# Patient Record
Sex: FEMALE | Race: WHITE | NOT HISPANIC OR LATINO
[De-identification: names, ages, dates, MRNs, and addresses within clinical notes are randomized per-mention and may not be internally consistent; named-entity substitution may affect disease eponyms.]

---

## 2017-01-03 ENCOUNTER — TRANSCRIPTION ENCOUNTER (OUTPATIENT)
Age: 27
End: 2017-01-03

## 2017-01-05 ENCOUNTER — TRANSCRIPTION ENCOUNTER (OUTPATIENT)
Age: 27
End: 2017-01-05

## 2017-01-17 ENCOUNTER — TRANSCRIPTION ENCOUNTER (OUTPATIENT)
Age: 27
End: 2017-01-17

## 2017-01-18 ENCOUNTER — APPOINTMENT (OUTPATIENT)
Dept: RHEUMATOLOGY | Facility: CLINIC | Age: 27
End: 2017-01-18

## 2017-01-31 ENCOUNTER — OTHER (OUTPATIENT)
Age: 27
End: 2017-01-31

## 2017-03-19 ENCOUNTER — INPATIENT (INPATIENT)
Facility: HOSPITAL | Age: 27
LOS: 1 days | Discharge: HOME | End: 2017-03-21
Attending: INTERNAL MEDICINE | Admitting: INTERNAL MEDICINE
Payer: COMMERCIAL

## 2017-03-19 VITALS — WEIGHT: 134.92 LBS

## 2017-03-19 DIAGNOSIS — K52.9 NONINFECTIVE GASTROENTERITIS AND COLITIS, UNSPECIFIED: ICD-10-CM

## 2017-03-19 DIAGNOSIS — E03.9 HYPOTHYROIDISM, UNSPECIFIED: ICD-10-CM

## 2017-03-19 DIAGNOSIS — E87.6 HYPOKALEMIA: ICD-10-CM

## 2017-03-19 DIAGNOSIS — E86.0 DEHYDRATION: ICD-10-CM

## 2017-03-19 DIAGNOSIS — R74.8 ABNORMAL LEVELS OF OTHER SERUM ENZYMES: ICD-10-CM

## 2017-03-19 DIAGNOSIS — Z86.011 PERSONAL HISTORY OF BENIGN NEOPLASM OF THE BRAIN: Chronic | ICD-10-CM

## 2017-03-19 DIAGNOSIS — E83.42 HYPOMAGNESEMIA: ICD-10-CM

## 2017-03-19 DIAGNOSIS — K21.9 GASTRO-ESOPHAGEAL REFLUX DISEASE WITHOUT ESOPHAGITIS: ICD-10-CM

## 2017-03-19 DIAGNOSIS — D72.829 ELEVATED WHITE BLOOD CELL COUNT, UNSPECIFIED: ICD-10-CM

## 2017-03-19 DIAGNOSIS — R31.29 OTHER MICROSCOPIC HEMATURIA: ICD-10-CM

## 2017-03-19 DIAGNOSIS — E87.2 ACIDOSIS: ICD-10-CM

## 2017-03-19 DIAGNOSIS — J45.909 UNSPECIFIED ASTHMA, UNCOMPLICATED: ICD-10-CM

## 2017-03-19 LAB
ALBUMIN SERPL ELPH-MCNC: 3.3 G/DL — SIGNIFICANT CHANGE UP (ref 3.3–5)
ALP SERPL-CCNC: 60 U/L — SIGNIFICANT CHANGE UP (ref 40–120)
ALT FLD-CCNC: 15 U/L — SIGNIFICANT CHANGE UP (ref 12–78)
ANION GAP SERPL CALC-SCNC: 12 MMOL/L — SIGNIFICANT CHANGE UP (ref 5–17)
APPEARANCE UR: CLEAR — SIGNIFICANT CHANGE UP
AST SERPL-CCNC: 19 U/L — SIGNIFICANT CHANGE UP (ref 15–37)
BASOPHILS # BLD AUTO: 0.1 K/UL — SIGNIFICANT CHANGE UP (ref 0–0.2)
BASOPHILS NFR BLD AUTO: 0.5 % — SIGNIFICANT CHANGE UP (ref 0–2)
BILIRUB SERPL-MCNC: 0.5 MG/DL — SIGNIFICANT CHANGE UP (ref 0.2–1.2)
BILIRUB UR-MCNC: NEGATIVE — SIGNIFICANT CHANGE UP
BUN SERPL-MCNC: 8 MG/DL — SIGNIFICANT CHANGE UP (ref 7–23)
CALCIUM SERPL-MCNC: 8.2 MG/DL — LOW (ref 8.5–10.1)
CHLORIDE SERPL-SCNC: 112 MMOL/L — HIGH (ref 96–108)
CO2 SERPL-SCNC: 20 MMOL/L — LOW (ref 22–31)
COLOR SPEC: YELLOW — SIGNIFICANT CHANGE UP
CREAT SERPL-MCNC: 0.91 MG/DL — SIGNIFICANT CHANGE UP (ref 0.5–1.3)
DIFF PNL FLD: (no result)
EOSINOPHIL # BLD AUTO: 0.1 K/UL — SIGNIFICANT CHANGE UP (ref 0–0.5)
EOSINOPHIL NFR BLD AUTO: 0.8 % — SIGNIFICANT CHANGE UP (ref 0–6)
GLUCOSE SERPL-MCNC: 83 MG/DL — SIGNIFICANT CHANGE UP (ref 70–99)
GLUCOSE UR QL: NEGATIVE MG/DL — SIGNIFICANT CHANGE UP
HCT VFR BLD CALC: 44.6 % — SIGNIFICANT CHANGE UP (ref 34.5–45)
HGB BLD-MCNC: 15.5 G/DL — SIGNIFICANT CHANGE UP (ref 11.5–15.5)
KETONES UR-MCNC: (no result)
LEUKOCYTE ESTERASE UR-ACNC: NEGATIVE — SIGNIFICANT CHANGE UP
LIDOCAIN IGE QN: 3502 U/L — HIGH (ref 73–393)
LYMPHOCYTES # BLD AUTO: 1.5 K/UL — SIGNIFICANT CHANGE UP (ref 1–3.3)
LYMPHOCYTES # BLD AUTO: 8.3 % — LOW (ref 13–44)
MAGNESIUM SERPL-MCNC: 1.4 MG/DL — LOW (ref 1.8–2.4)
MCHC RBC-ENTMCNC: 31.6 PG — SIGNIFICANT CHANGE UP (ref 27–34)
MCHC RBC-ENTMCNC: 34.8 GM/DL — SIGNIFICANT CHANGE UP (ref 32–36)
MCV RBC AUTO: 90.7 FL — SIGNIFICANT CHANGE UP (ref 80–100)
MONOCYTES # BLD AUTO: 0.7 K/UL — SIGNIFICANT CHANGE UP (ref 0–0.9)
MONOCYTES NFR BLD AUTO: 3.9 % — SIGNIFICANT CHANGE UP (ref 2–14)
NEUTROPHILS # BLD AUTO: 15.7 K/UL — HIGH (ref 1.8–7.4)
NEUTROPHILS NFR BLD AUTO: 86.5 % — HIGH (ref 43–77)
NITRITE UR-MCNC: NEGATIVE — SIGNIFICANT CHANGE UP
PH UR: 7 — SIGNIFICANT CHANGE UP (ref 4.8–8)
PLATELET # BLD AUTO: 334 K/UL — SIGNIFICANT CHANGE UP (ref 150–400)
POTASSIUM SERPL-MCNC: 3.3 MMOL/L — LOW (ref 3.5–5.3)
POTASSIUM SERPL-SCNC: 3.3 MMOL/L — LOW (ref 3.5–5.3)
PROT SERPL-MCNC: 6.4 GM/DL — SIGNIFICANT CHANGE UP (ref 6–8.3)
PROT UR-MCNC: NEGATIVE MG/DL — SIGNIFICANT CHANGE UP
RBC # BLD: 4.92 M/UL — SIGNIFICANT CHANGE UP (ref 3.8–5.2)
RBC # FLD: 11.1 % — SIGNIFICANT CHANGE UP (ref 10.3–14.5)
RBC CASTS # UR COMP ASSIST: (no result) /HPF (ref 0–4)
SODIUM SERPL-SCNC: 144 MMOL/L — SIGNIFICANT CHANGE UP (ref 135–145)
SP GR SPEC: 1 — LOW (ref 1.01–1.02)
UROBILINOGEN FLD QL: NEGATIVE MG/DL — SIGNIFICANT CHANGE UP
WBC # BLD: 18.1 K/UL — HIGH (ref 3.8–10.5)
WBC # FLD AUTO: 18.1 K/UL — HIGH (ref 3.8–10.5)
WBC UR QL: SIGNIFICANT CHANGE UP

## 2017-03-19 PROCEDURE — 76705 ECHO EXAM OF ABDOMEN: CPT | Mod: 26

## 2017-03-19 PROCEDURE — 99285 EMERGENCY DEPT VISIT HI MDM: CPT | Mod: 25

## 2017-03-19 RX ORDER — FLUTICASONE PROPIONATE AND SALMETEROL 50; 250 UG/1; UG/1
1 POWDER ORAL; RESPIRATORY (INHALATION)
Qty: 0 | Refills: 0 | Status: DISCONTINUED | OUTPATIENT
Start: 2017-03-19 | End: 2017-03-21

## 2017-03-19 RX ORDER — ONDANSETRON 8 MG/1
4 TABLET, FILM COATED ORAL EVERY 6 HOURS
Qty: 0 | Refills: 0 | Status: DISCONTINUED | OUTPATIENT
Start: 2017-03-19 | End: 2017-03-21

## 2017-03-19 RX ORDER — PANTOPRAZOLE SODIUM 20 MG/1
40 TABLET, DELAYED RELEASE ORAL
Qty: 0 | Refills: 0 | Status: DISCONTINUED | OUTPATIENT
Start: 2017-03-19 | End: 2017-03-21

## 2017-03-19 RX ORDER — FLUTICASONE PROPIONATE AND SALMETEROL 50; 250 UG/1; UG/1
1 POWDER ORAL; RESPIRATORY (INHALATION)
Qty: 0 | Refills: 0 | Status: DISCONTINUED | OUTPATIENT
Start: 2017-03-19 | End: 2017-03-19

## 2017-03-19 RX ORDER — FLUTICASONE PROPIONATE 50 MCG
1 SPRAY, SUSPENSION NASAL
Qty: 0 | Refills: 0 | Status: DISCONTINUED | OUTPATIENT
Start: 2017-03-19 | End: 2017-03-21

## 2017-03-19 RX ORDER — MAGNESIUM SULFATE 500 MG/ML
2 VIAL (ML) INJECTION ONCE
Qty: 0 | Refills: 0 | Status: COMPLETED | OUTPATIENT
Start: 2017-03-19 | End: 2017-03-19

## 2017-03-19 RX ORDER — IPRATROPIUM BROMIDE 0.2 MG/ML
500 SOLUTION, NON-ORAL INHALATION ONCE
Qty: 0 | Refills: 0 | Status: COMPLETED | OUTPATIENT
Start: 2017-03-19 | End: 2017-03-19

## 2017-03-19 RX ORDER — FAMOTIDINE 10 MG/ML
20 INJECTION INTRAVENOUS ONCE
Qty: 0 | Refills: 0 | Status: COMPLETED | OUTPATIENT
Start: 2017-03-19 | End: 2017-03-19

## 2017-03-19 RX ORDER — ALBUTEROL 90 UG/1
2.5 AEROSOL, METERED ORAL ONCE
Qty: 0 | Refills: 0 | Status: COMPLETED | OUTPATIENT
Start: 2017-03-19 | End: 2017-03-19

## 2017-03-19 RX ORDER — SODIUM CHLORIDE 9 MG/ML
2000 INJECTION INTRAMUSCULAR; INTRAVENOUS; SUBCUTANEOUS ONCE
Qty: 0 | Refills: 0 | Status: COMPLETED | OUTPATIENT
Start: 2017-03-19 | End: 2017-03-19

## 2017-03-19 RX ORDER — IPRATROPIUM/ALBUTEROL SULFATE 18-103MCG
1 AEROSOL WITH ADAPTER (GRAM) INHALATION
Qty: 0 | Refills: 0 | Status: DISCONTINUED | OUTPATIENT
Start: 2017-03-19 | End: 2017-03-19

## 2017-03-19 RX ORDER — POTASSIUM CHLORIDE 20 MEQ
10 PACKET (EA) ORAL ONCE
Qty: 0 | Refills: 0 | Status: COMPLETED | OUTPATIENT
Start: 2017-03-19 | End: 2017-03-19

## 2017-03-19 RX ORDER — MONTELUKAST 4 MG/1
10 TABLET, CHEWABLE ORAL DAILY
Qty: 0 | Refills: 0 | Status: DISCONTINUED | OUTPATIENT
Start: 2017-03-19 | End: 2017-03-20

## 2017-03-19 RX ORDER — LEVOTHYROXINE SODIUM 125 MCG
75 TABLET ORAL DAILY
Qty: 0 | Refills: 0 | Status: DISCONTINUED | OUTPATIENT
Start: 2017-03-19 | End: 2017-03-21

## 2017-03-19 RX ORDER — POTASSIUM CHLORIDE 20 MEQ
10 PACKET (EA) ORAL
Qty: 0 | Refills: 0 | Status: COMPLETED | OUTPATIENT
Start: 2017-03-19 | End: 2017-03-19

## 2017-03-19 RX ORDER — ONDANSETRON 8 MG/1
8 TABLET, FILM COATED ORAL ONCE
Qty: 0 | Refills: 0 | Status: COMPLETED | OUTPATIENT
Start: 2017-03-19 | End: 2017-03-19

## 2017-03-19 RX ADMIN — SODIUM CHLORIDE 2000 MILLILITER(S): 9 INJECTION INTRAMUSCULAR; INTRAVENOUS; SUBCUTANEOUS at 03:48

## 2017-03-19 RX ADMIN — ALBUTEROL 2.5 MILLIGRAM(S): 90 AEROSOL, METERED ORAL at 06:30

## 2017-03-19 RX ADMIN — Medication 100 MILLIEQUIVALENT(S): at 14:27

## 2017-03-19 RX ADMIN — Medication 500 MICROGRAM(S): at 06:30

## 2017-03-19 RX ADMIN — FAMOTIDINE 20 MILLIGRAM(S): 10 INJECTION INTRAVENOUS at 04:11

## 2017-03-19 RX ADMIN — Medication 100 MILLIEQUIVALENT(S): at 16:41

## 2017-03-19 RX ADMIN — Medication 100 MILLIEQUIVALENT(S): at 17:54

## 2017-03-19 RX ADMIN — Medication 50 GRAM(S): at 14:27

## 2017-03-19 RX ADMIN — ONDANSETRON 8 MILLIGRAM(S): 8 TABLET, FILM COATED ORAL at 04:10

## 2017-03-19 RX ADMIN — Medication 1 SPRAY(S): at 21:21

## 2017-03-19 RX ADMIN — Medication 100 MILLIEQUIVALENT(S): at 06:11

## 2017-03-19 RX ADMIN — ONDANSETRON 4 MILLIGRAM(S): 8 TABLET, FILM COATED ORAL at 18:16

## 2017-03-19 RX ADMIN — MONTELUKAST 10 MILLIGRAM(S): 4 TABLET, CHEWABLE ORAL at 21:21

## 2017-03-19 NOTE — ED PROVIDER NOTE - DETAILS:
Mercedes Baeza MD - The scribe's documentation has been prepared under my direction and personally reviewed by me in its entirety. I confirm that the note above accurately reflects all work, treatment, procedures, and medical decision making performed by me.

## 2017-03-19 NOTE — H&P ADULT - NSHPLABSRESULTS_GEN_ALL_CORE
15.5   18.1  )-----------( 334      ( 19 Mar 2017 05:06 )             44.6     19 Mar 2017 05:06    144    |  112    |  8      ----------------------------<  83     3.3     |  20     |  0.91     Ca    8.2        19 Mar 2017 05:06  Mg     1.4       19 Mar 2017 07:19    TPro  6.4    /  Alb  3.3    /  TBili  0.5    /  DBili  x      /  AST  19     /  ALT  15     /  AlkPhos  60     19 Mar 2017 05:06        LIVER FUNCTIONS - ( 19 Mar 2017 05:06 )  Alb: 3.3 g/dL / Pro: 6.4 gm/dL / ALK PHOS: 60 U/L / ALT: 15 U/L / AST: 19 U/L / GGT: x             Urinalysis Basic - ( 19 Mar 2017 07:42 )    Color: Yellow / Appearance: Clear / S.005 / pH: x  Gluc: x / Ketone: Small  / Bili: Negative / Urobili: Negative mg/dL   Blood: x / Protein: Negative mg/dL / Nitrite: Negative   Leuk Esterase: Negative / RBC: 3-5 /HPF / WBC 0-2   Sq Epi: x / Non Sq Epi: x / Bacteria: x        EXAM:  US ABDOMEN LIMITED                        PROCEDURE DATE:  2017      FINDINGS:   The liver demonstrates homogeneous echotexture without focal lesion.    Hepatic size and contours are maintained.  Hepatic and portal veins   appear patent and are not displaced.  No intrahepatic or ductal   dilatation is found.  The common duct is not dilated, measuring 0.26 cm.    The gallbladder is intact without calculi.  There is no gallbaldder wall  thickening.  No tenderness was elicited with direct compression by the   transducer (no sonographic Fountain's sign identified). The pancreas   appears unremarkable.    The right kidney measures 10.3 cm in length.  It demonstrates no mass,   calculusor hydronephrosis.  The abdominal aorta and IVC appear intact.    IMPRESSION:   Unremarkable right upper quadrant ultrasound.

## 2017-03-19 NOTE — ED PROVIDER NOTE - OBJECTIVE STATEMENT
25 y/o female pmhx asthma, acid reflux, benign brain tumor resected in 2010 presents to ED due to v/d. Pt had a sinus infection and was prescribed Clindamycin be ENT. Pt took 7 days of 150 mg QID of clinda. She began with nausea on Wednesday, the 7th day of clinda. On Thursday and Friday pt had watery diarrhea. On Saturday pt had vomiting and diarrhea. Pt called ENT and told him she was having v/d. ENT advised her ot taking clinda, and called in a prescription for flagyl that she never went to . No real abd pain. C/o weakness, tired, no eating or drinking. Last menstrual period began today.

## 2017-03-19 NOTE — H&P ADULT - NSHPREVIEWOFSYSTEMS_GEN_ALL_CORE
CONSTITUTIONAL: No fevers or chills  EYES/ENT: No visual changes;  No vertigo or throat pain   NECK: No pain or stiffness  RESPIRATORY: No cough, wheezing, hemoptysis; No shortness of breath  CARDIOVASCULAR: No chest pain or palpitations  GASTROINTESTINAL:negative except per hpi  GENITOURINARY: No dysuria, frequency or hematuria  NEUROLOGICAL: No numbness or weakness  SKIN: No itching, burning, rashes, or lesions   12 point review of systems is negative unless indicated above.

## 2017-03-19 NOTE — H&P ADULT - PROBLEM SELECTOR PLAN 2
Non anion gap metabolic acidosis 2/2 diarrhea  S/p 2 L NS in ED  expect improvement if diarrhea resolved  recheck BMP in AM

## 2017-03-19 NOTE — H&P ADULT - FAMILY HISTORY
Mother  Still living? Unknown  Family history of hypothyroidism, Age at diagnosis: Age Unknown     Father  Still living? Unknown  Family history of bladder cancer, Age at diagnosis: Age Unknown

## 2017-03-19 NOTE — ED PROVIDER NOTE - CONSTITUTIONAL, MLM
normal... Well appearing, well nourished, awake, alert, oriented to person, place, time/situation and in no apparent distress. Well appearing, well nourished, awake, alert, oriented to person, place, time/situation and in no apparent distress.  Appears unwell but nontoxic

## 2017-03-19 NOTE — ED ADULT NURSE NOTE - OBJECTIVE STATEMENT
26 year old female presenting to the ED with parents complaining of nausea, vomiting, and diarrhea. Patient reports nausea and diarrhea x 3 days, with worsening vomiting and epigastric discomfort beginning tonight.

## 2017-03-19 NOTE — H&P ADULT - HISTORY OF PRESENT ILLNESS
25 yo F hx of asthma, gerd, hypothyroidism, khloe danlos syndrome presented eith severe nausea and vomiting since wednesday.  Pt reports that she compelted a 7 day course of clindamycin on Wednesday for treatment of sinus infection.  Since then she has been having intractable nausea/vomiting NBNB, unable to tolerate po , having diarrhea every hour, liquid no blood or mucus.   She was feeling very weak.  No recent travel or recent illness or unusual food exposure.  C/o some abd soreness from vomiting and diarrhea however no significant abd pain.  No chest pain, sob, no palpitations, no lightheadedness.    In the ED pt was tachycardic as high as 135 vs otherwise wnl,  labs - WBC 18K, Mag 1.4, K 3.3, HCO3 20, lipase 3500.  GB US was normal.  She got 2L NS bolus, pepcid IV and zofran.

## 2017-03-19 NOTE — H&P ADULT - NSHPPHYSICALEXAM_GEN_ALL_CORE
Constitutional: NAD, well-groomed, well-developed  HEENT: PERRLA, EOMI, Normal Hearing, MMM  Neck: No JVD  Back: Normal spine flexure, No CVA tenderness  Respiratory: CTABL  Cardiovascular: S1 and S2, RRR, no M/G/R  Gastrointestinal: BS+, soft, NT/ND  Extremities: No peripheral edema  Vascular: 2+ peripheral pulses  Neurological: A/O x 3, no focal deficits  Psychiatric: Normal mood, normal affect  Musculoskeletal: 5/5 strength b/l upper and lower extremities  Skin: No rashes

## 2017-03-19 NOTE — ED ADULT NURSE NOTE - CHPI ED SYMPTOMS NEG
no hematuria/no chills/no blood in stool/no fever/no abdominal distension/no burning urination/no dysuria

## 2017-03-19 NOTE — H&P ADULT - PROBLEM SELECTOR PLAN 1
Likely related to recent antibiotic use  admit to med surg  advnace diet as tolerated  c/w zofran and ppi  if diarrhea persists will check for cdiff- however no BM in 8 hours.

## 2017-03-19 NOTE — ED PROVIDER NOTE - MEDICAL DECISION MAKING DETAILS
27 yo female with n/v/d with pancreatitis, will US r/o gallstones as no other clear source.  will admit for further evaluation

## 2017-03-19 NOTE — ED ADULT NURSE REASSESSMENT NOTE - NS ED NURSE REASSESS COMMENT FT1
pt received at 0700 alert and orietnedx4, VSS afebrile, pt denies pain, pt resting comfortably in bed, no complaints at this time. Mother at bedside. All needs anticipated and met, safety maintained will continue to monitor

## 2017-03-20 LAB
ANION GAP SERPL CALC-SCNC: 8 MMOL/L — SIGNIFICANT CHANGE UP (ref 5–17)
BUN SERPL-MCNC: 5 MG/DL — LOW (ref 7–23)
CALCIUM SERPL-MCNC: 7.9 MG/DL — LOW (ref 8.5–10.1)
CHLORIDE SERPL-SCNC: 110 MMOL/L — HIGH (ref 96–108)
CO2 SERPL-SCNC: 26 MMOL/L — SIGNIFICANT CHANGE UP (ref 22–31)
CREAT SERPL-MCNC: 0.75 MG/DL — SIGNIFICANT CHANGE UP (ref 0.5–1.3)
GLUCOSE SERPL-MCNC: 87 MG/DL — SIGNIFICANT CHANGE UP (ref 70–99)
HCT VFR BLD CALC: 35.4 % — SIGNIFICANT CHANGE UP (ref 34.5–45)
HGB BLD-MCNC: 11.8 G/DL — SIGNIFICANT CHANGE UP (ref 11.5–15.5)
LIDOCAIN IGE QN: 149 U/L — SIGNIFICANT CHANGE UP (ref 73–393)
MCHC RBC-ENTMCNC: 30 PG — SIGNIFICANT CHANGE UP (ref 27–34)
MCHC RBC-ENTMCNC: 33.2 GM/DL — SIGNIFICANT CHANGE UP (ref 32–36)
MCV RBC AUTO: 90.5 FL — SIGNIFICANT CHANGE UP (ref 80–100)
PLATELET # BLD AUTO: 334 K/UL — SIGNIFICANT CHANGE UP (ref 150–400)
POTASSIUM SERPL-MCNC: 3.4 MMOL/L — LOW (ref 3.5–5.3)
POTASSIUM SERPL-SCNC: 3.4 MMOL/L — LOW (ref 3.5–5.3)
RBC # BLD: 3.92 M/UL — SIGNIFICANT CHANGE UP (ref 3.8–5.2)
RBC # FLD: 11.2 % — SIGNIFICANT CHANGE UP (ref 10.3–14.5)
SODIUM SERPL-SCNC: 144 MMOL/L — SIGNIFICANT CHANGE UP (ref 135–145)
WBC # BLD: 6.7 K/UL — SIGNIFICANT CHANGE UP (ref 3.8–10.5)
WBC # FLD AUTO: 6.7 K/UL — SIGNIFICANT CHANGE UP (ref 3.8–10.5)

## 2017-03-20 RX ORDER — POTASSIUM CHLORIDE 20 MEQ
10 PACKET (EA) ORAL
Qty: 0 | Refills: 0 | Status: COMPLETED | OUTPATIENT
Start: 2017-03-20 | End: 2017-03-20

## 2017-03-20 RX ORDER — ONDANSETRON 8 MG/1
4 TABLET, FILM COATED ORAL ONCE
Qty: 0 | Refills: 0 | Status: COMPLETED | OUTPATIENT
Start: 2017-03-20 | End: 2017-03-20

## 2017-03-20 RX ORDER — ONDANSETRON 8 MG/1
4 TABLET, FILM COATED ORAL EVERY 6 HOURS
Qty: 0 | Refills: 0 | Status: DISCONTINUED | OUTPATIENT
Start: 2017-03-20 | End: 2017-03-21

## 2017-03-20 RX ORDER — MONTELUKAST 4 MG/1
10 TABLET, CHEWABLE ORAL AT BEDTIME
Qty: 0 | Refills: 0 | Status: DISCONTINUED | OUTPATIENT
Start: 2017-03-20 | End: 2017-03-21

## 2017-03-20 RX ADMIN — FLUTICASONE PROPIONATE AND SALMETEROL 1 DOSE(S): 50; 250 POWDER ORAL; RESPIRATORY (INHALATION) at 08:49

## 2017-03-20 RX ADMIN — PANTOPRAZOLE SODIUM 40 MILLIGRAM(S): 20 TABLET, DELAYED RELEASE ORAL at 10:43

## 2017-03-20 RX ADMIN — FLUTICASONE PROPIONATE AND SALMETEROL 1 DOSE(S): 50; 250 POWDER ORAL; RESPIRATORY (INHALATION) at 20:11

## 2017-03-20 RX ADMIN — Medication 100 MILLIEQUIVALENT(S): at 19:20

## 2017-03-20 RX ADMIN — Medication 1 SPRAY(S): at 10:52

## 2017-03-20 RX ADMIN — Medication 75 MICROGRAM(S): at 05:49

## 2017-03-20 RX ADMIN — Medication 100 MILLIEQUIVALENT(S): at 21:30

## 2017-03-20 RX ADMIN — MONTELUKAST 10 MILLIGRAM(S): 4 TABLET, CHEWABLE ORAL at 21:31

## 2017-03-20 RX ADMIN — Medication 1 SPRAY(S): at 17:09

## 2017-03-20 RX ADMIN — Medication 100 MILLIEQUIVALENT(S): at 18:04

## 2017-03-20 RX ADMIN — ONDANSETRON 4 MILLIGRAM(S): 8 TABLET, FILM COATED ORAL at 15:11

## 2017-03-20 RX ADMIN — ONDANSETRON 4 MILLIGRAM(S): 8 TABLET, FILM COATED ORAL at 17:09

## 2017-03-20 NOTE — PROGRESS NOTE ADULT - PROBLEM SELECTOR PLAN 2
Non anion gap metabolic acidosis 2/2 diarrhea- resolved  S/p 2 L NS in ED  expect improvement if diarrhea resolved

## 2017-03-20 NOTE — PROGRESS NOTE ADULT - SUBJECTIVE AND OBJECTIVE BOX
CHIEF COMPLAINT: Nausea/ vomiting, diarrhea    SUBJECTIVE: Tolerating only minimal clear liquids with nausea.  Not vomiting.  Diarrhea decreased in frequency.      REVIEW OF SYSTEMS:  All other review of systems is negative unless indicated above    Vital Signs Last 24 Hrs  T(C): 36.4, Max: 37.3 (03-19 @ 11:08)  T(F): 97.6, Max: 99.1 (03-19 @ 11:08)  HR: 94 (94 - 95)  BP: 105/52 (91/69 - 108/66)  BP(mean): --  RR: 19 (18 - 19)  SpO2: 99% (99% - 100%)    I&O's Summary      CAPILLARY BLOOD GLUCOSE      PHYSICAL EXAM:  Constitutional: NAD, well-groomed, well-developed  HEENT: PERRLA, EOMI, Normal Hearing, MMM  Neck: No JVD  Back: Normal spine flexure, No CVA tenderness  Respiratory: CTABL  Cardiovascular: S1 and S2, RRR, no M/G/R  Gastrointestinal: BS+, soft, NT/ND  Extremities: No peripheral edema  Vascular: 2+ peripheral pulses  Neurological: A/O x 3, no focal deficits  Psychiatric: Normal mood, normal affect  Musculoskeletal: 5/5 strength b/l upper and lower extremities  Skin: No rashes    MEDICATIONS:  MEDICATIONS  (STANDING):  montelukast 10milliGRAM(s) Oral daily  fluticasone propionate 50 MICROgram(s)/spray Nasal Spray 1Spray(s) Both Nostrils two times a day  pantoprazole    Tablet 40milliGRAM(s) Oral before breakfast  levothyroxine 75MICROGram(s) Oral daily  fluticasone / salmeterol 500-50 MICROgram(s) Diskus - 1Dose(s) Inhalation two times a day  freetext medication  - 1Puff(s) Oral four times a day      LABS: All Labs Reviewed:                        11.8   6.7   )-----------( 334      ( 20 Mar 2017 07:44 )             35.4     20 Mar 2017 07:44    144    |  110    |  5      ----------------------------<  87     3.4     |  26     |  0.75     Ca    7.9        20 Mar 2017 07:44  Mg     1.4       19 Mar 2017 07:19    TPro  6.4    /  Alb  3.3    /  TBili  0.5    /  DBili  x      /  AST  19     /  ALT  15     /  AlkPhos  60     19 Mar 2017 05:06

## 2017-03-20 NOTE — DIETITIAN INITIAL EVALUATION ADULT. - OTHER INFO
Pt reports continued but decreased nausea with no vomiting. Pt consumes 50% of meals and tolerates, pt is on regular diet. Pt urged to make low fiber choices. Pt reports continued but decreased nausea with no vomiting. Pt consumes 50% of meals and tolerates, pt is on regular diet, appears well nourished.  Pt urged to make low fiber choices.

## 2017-03-21 VITALS
SYSTOLIC BLOOD PRESSURE: 107 MMHG | DIASTOLIC BLOOD PRESSURE: 72 MMHG | TEMPERATURE: 98 F | HEART RATE: 98 BPM | OXYGEN SATURATION: 100 % | RESPIRATION RATE: 18 BRPM

## 2017-03-21 RX ORDER — ONDANSETRON 8 MG/1
1 TABLET, FILM COATED ORAL
Qty: 16 | Refills: 0 | OUTPATIENT
Start: 2017-03-21 | End: 2017-03-25

## 2017-03-21 RX ADMIN — Medication 1 SPRAY(S): at 06:30

## 2017-03-21 RX ADMIN — FLUTICASONE PROPIONATE AND SALMETEROL 1 DOSE(S): 50; 250 POWDER ORAL; RESPIRATORY (INHALATION) at 08:44

## 2017-03-21 RX ADMIN — PANTOPRAZOLE SODIUM 40 MILLIGRAM(S): 20 TABLET, DELAYED RELEASE ORAL at 10:36

## 2017-03-21 RX ADMIN — Medication 75 MICROGRAM(S): at 06:36

## 2017-03-21 NOTE — DISCHARGE NOTE ADULT - CARE PLAN
Principal Discharge DX:	Gastroenteritis  Goal:	symptom control  Instructions for follow-up, activity and diet:	take zofran as needed for nausea  Secondary Diagnosis:	Dehydration  Goal:	hydration  Secondary Diagnosis:	Acidosis, metabolic  Instructions for follow-up, activity and diet:	due to gastroenteritis- resolved  Secondary Diagnosis:	Microscopic hematuria  Goal:	recheck with primary care doctor  Instructions for follow-up, activity and diet:	your urine had microscopic amounts of blood in it which is abnormal, this was likely related to ongoing menses.  Follow up with your primary care doctor to have this rechecked.  Secondary Diagnosis:	Hypokalemia, gastrointestinal losses

## 2017-03-21 NOTE — DISCHARGE NOTE ADULT - HOSPITAL COURSE
25 yo F hx of asthma, gerd, hypothyroidism, khloe danlos syndrome presented eith severe nausea and vomiting since wednesday.  Pt reports that she compelted a 7 day course of clindamycin on Wednesday for treatment of sinus infection.  Since then she has been having intractable nausea/vomiting NBNB, unable to tolerate po , having diarrhea every hour, liquid no blood or mucus.   She was feeling very weak.  No recent travel or recent illness or unusual food exposure.  C/o some abd soreness from vomiting and diarrhea however no significant abd pain.  No chest pain, sob, no palpitations, no lightheadedness.    In the ED pt was tachycardic as high as 135 vs otherwise wnl,  labs - WBC 18K, Mag 1.4, K 3.3, HCO3 20, lipase 3500.  GB US was normal.  She got 2L NS bolus, pepcid IV and zofran.    During hospital course nausea/vomiting was contorlled with zofran, diarrhea improved and diet was advanced.  Lipase and WBC normalized.  dehydration resolved.  On AM of discharge VS wnl, pt feels well and wants to go home.

## 2017-03-21 NOTE — DISCHARGE NOTE ADULT - MEDICATION SUMMARY - MEDICATIONS TO TAKE
I will START or STAY ON the medications listed below when I get home from the hospital:    freetext medication  -  -- 1 puff(s) by mouth 4 times a day  -- Indication: For Asthma    Zofran ODT 8 mg oral tablet, disintegrating  -- 1 tab(s) by mouth every 6 hours  -- Indication: For Nausea vomiting     Advair Diskus 500 mcg-50 mcg inhalation powder  -- 1 puff(s) inhaled 2 times a day  -- Indication: For Asthma    Combivent Respimat CFC free 20 mcg-100 mcg/inh inhalation aerosol  -- 1 puff(s) inhaled 4 times a day, As Needed  -- Indication: For Asthma    Singulair 10 mg oral tablet  -- 1 tab(s) by mouth once a day  -- Indication: For Asthma    azelastine-fluticasone 137 mcg-50 mcg/inh nasal spray  -- 1 spray(s) into nose 2 times a day  -- Indication: For sinus    omeprazole 40 mg oral delayed release capsule  -- 1 cap(s) by mouth once a day  -- Indication: For GERD (gastroesophageal reflux disease)    Blisovi 24 FE 20 mcg-1 mg oral tablet  -- 1 tab(s) by mouth once a day  -- Indication: For OCP    Synthroid 75 mcg (0.075 mg) oral tablet  -- 1 tab(s) by mouth once a day  -- Indication: For Hypothyroid

## 2017-03-21 NOTE — DISCHARGE NOTE ADULT - PLAN OF CARE
symptom control take zofran as needed for nausea hydration due to gastroenteritis- resolved recheck with primary care doctor your urine had microscopic amounts of blood in it which is abnormal, this was likely related to ongoing menses.  Follow up with your primary care doctor to have this rechecked.

## 2017-03-21 NOTE — DISCHARGE NOTE ADULT - PATIENT PORTAL LINK FT
“You can access the [facility] Patient Portal, offered by [facility], by registering with the following website: [email or web address]”

## 2017-03-23 DIAGNOSIS — E87.6 HYPOKALEMIA: ICD-10-CM

## 2017-03-23 DIAGNOSIS — E03.9 HYPOTHYROIDISM, UNSPECIFIED: ICD-10-CM

## 2017-03-23 DIAGNOSIS — E86.0 DEHYDRATION: ICD-10-CM

## 2017-03-23 DIAGNOSIS — E87.2 ACIDOSIS: ICD-10-CM

## 2017-03-23 DIAGNOSIS — K52.1 TOXIC GASTROENTERITIS AND COLITIS: ICD-10-CM

## 2017-03-23 DIAGNOSIS — E83.42 HYPOMAGNESEMIA: ICD-10-CM

## 2017-03-23 DIAGNOSIS — J45.909 UNSPECIFIED ASTHMA, UNCOMPLICATED: ICD-10-CM
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
## 2017-03-23 DIAGNOSIS — T36.95XA ADVERSE EFFECT OF UNSPECIFIED SYSTEMIC ANTIBIOTIC, INITIAL ENCOUNTER: ICD-10-CM

## 2017-03-23 DIAGNOSIS — K21.9 GASTRO-ESOPHAGEAL REFLUX DISEASE WITHOUT ESOPHAGITIS: ICD-10-CM

## 2017-03-31 ENCOUNTER — TRANSCRIPTION ENCOUNTER (OUTPATIENT)
Age: 27
End: 2017-03-31

## 2017-04-25 ENCOUNTER — APPOINTMENT (OUTPATIENT)
Dept: PEDIATRIC ALLERGY IMMUNOLOGY | Facility: CLINIC | Age: 27
End: 2017-04-25

## 2017-05-08 ENCOUNTER — RX RENEWAL (OUTPATIENT)
Age: 27
End: 2017-05-08

## 2017-05-29 ENCOUNTER — EMERGENCY (EMERGENCY)
Facility: HOSPITAL | Age: 27
LOS: 0 days | Discharge: HOME | End: 2017-05-30
Attending: EMERGENCY MEDICINE | Admitting: EMERGENCY MEDICINE
Payer: COMMERCIAL

## 2017-05-29 VITALS
TEMPERATURE: 98 F | OXYGEN SATURATION: 100 % | SYSTOLIC BLOOD PRESSURE: 144 MMHG | RESPIRATION RATE: 22 BRPM | DIASTOLIC BLOOD PRESSURE: 92 MMHG | HEART RATE: 139 BPM

## 2017-05-29 VITALS — WEIGHT: 160.06 LBS | HEIGHT: 63 IN

## 2017-05-29 DIAGNOSIS — Q79.6 EHLERS-DANLOS SYNDROMES: ICD-10-CM

## 2017-05-29 DIAGNOSIS — D80.2 SELECTIVE DEFICIENCY OF IMMUNOGLOBULIN A [IGA]: ICD-10-CM

## 2017-05-29 DIAGNOSIS — Z86.011 PERSONAL HISTORY OF BENIGN NEOPLASM OF THE BRAIN: Chronic | ICD-10-CM

## 2017-05-29 DIAGNOSIS — J45.909 UNSPECIFIED ASTHMA, UNCOMPLICATED: ICD-10-CM

## 2017-05-29 DIAGNOSIS — R06.02 SHORTNESS OF BREATH: ICD-10-CM

## 2017-05-29 DIAGNOSIS — K21.9 GASTRO-ESOPHAGEAL REFLUX DISEASE WITHOUT ESOPHAGITIS: ICD-10-CM

## 2017-05-29 DIAGNOSIS — E03.9 HYPOTHYROIDISM, UNSPECIFIED: ICD-10-CM

## 2017-05-29 LAB
ALBUMIN SERPL ELPH-MCNC: 3.5 G/DL — SIGNIFICANT CHANGE UP (ref 3.3–5)
ALP SERPL-CCNC: 71 U/L — SIGNIFICANT CHANGE UP (ref 40–120)
ALT FLD-CCNC: 19 U/L — SIGNIFICANT CHANGE UP (ref 12–78)
ANION GAP SERPL CALC-SCNC: 8 MMOL/L — SIGNIFICANT CHANGE UP (ref 5–17)
APPEARANCE UR: CLEAR — SIGNIFICANT CHANGE UP
AST SERPL-CCNC: 16 U/L — SIGNIFICANT CHANGE UP (ref 15–37)
BACTERIA # UR AUTO: (no result)
BASOPHILS # BLD AUTO: 0.1 K/UL — SIGNIFICANT CHANGE UP (ref 0–0.2)
BASOPHILS NFR BLD AUTO: 1.5 % — SIGNIFICANT CHANGE UP (ref 0–2)
BILIRUB SERPL-MCNC: 0.2 MG/DL — SIGNIFICANT CHANGE UP (ref 0.2–1.2)
BILIRUB UR-MCNC: NEGATIVE — SIGNIFICANT CHANGE UP
BUN SERPL-MCNC: 6 MG/DL — LOW (ref 7–23)
CALCIUM SERPL-MCNC: 8.6 MG/DL — SIGNIFICANT CHANGE UP (ref 8.5–10.1)
CHLORIDE SERPL-SCNC: 111 MMOL/L — HIGH (ref 96–108)
CK SERPL-CCNC: 143 U/L — SIGNIFICANT CHANGE UP (ref 26–192)
CO2 SERPL-SCNC: 23 MMOL/L — SIGNIFICANT CHANGE UP (ref 22–31)
COLOR SPEC: YELLOW — SIGNIFICANT CHANGE UP
CREAT SERPL-MCNC: 0.95 MG/DL — SIGNIFICANT CHANGE UP (ref 0.5–1.3)
D DIMER BLD IA.RAPID-MCNC: 169 NG/ML DDU — SIGNIFICANT CHANGE UP
DIFF PNL FLD: (no result)
EOSINOPHIL # BLD AUTO: 0.2 K/UL — SIGNIFICANT CHANGE UP (ref 0–0.5)
EOSINOPHIL NFR BLD AUTO: 2.3 % — SIGNIFICANT CHANGE UP (ref 0–6)
EPI CELLS # UR: SIGNIFICANT CHANGE UP
GLUCOSE SERPL-MCNC: 115 MG/DL — HIGH (ref 70–99)
GLUCOSE UR QL: NEGATIVE MG/DL — SIGNIFICANT CHANGE UP
HCT VFR BLD CALC: 39 % — SIGNIFICANT CHANGE UP (ref 34.5–45)
HGB BLD-MCNC: 13.5 G/DL — SIGNIFICANT CHANGE UP (ref 11.5–15.5)
KETONES UR-MCNC: NEGATIVE — SIGNIFICANT CHANGE UP
LEUKOCYTE ESTERASE UR-ACNC: NEGATIVE — SIGNIFICANT CHANGE UP
LYMPHOCYTES # BLD AUTO: 2.7 K/UL — SIGNIFICANT CHANGE UP (ref 1–3.3)
LYMPHOCYTES # BLD AUTO: 30.4 % — SIGNIFICANT CHANGE UP (ref 13–44)
MCHC RBC-ENTMCNC: 31 PG — SIGNIFICANT CHANGE UP (ref 27–34)
MCHC RBC-ENTMCNC: 34.7 GM/DL — SIGNIFICANT CHANGE UP (ref 32–36)
MCV RBC AUTO: 89.3 FL — SIGNIFICANT CHANGE UP (ref 80–100)
MONOCYTES # BLD AUTO: 0.4 K/UL — SIGNIFICANT CHANGE UP (ref 0–0.9)
MONOCYTES NFR BLD AUTO: 5 % — SIGNIFICANT CHANGE UP (ref 2–14)
NEUTROPHILS # BLD AUTO: 5.4 K/UL — SIGNIFICANT CHANGE UP (ref 1.8–7.4)
NEUTROPHILS NFR BLD AUTO: 60.8 % — SIGNIFICANT CHANGE UP (ref 43–77)
NITRITE UR-MCNC: NEGATIVE — SIGNIFICANT CHANGE UP
PH UR: 7 — SIGNIFICANT CHANGE UP (ref 5–8)
PLATELET # BLD AUTO: 383 K/UL — SIGNIFICANT CHANGE UP (ref 150–400)
POTASSIUM SERPL-MCNC: 3.7 MMOL/L — SIGNIFICANT CHANGE UP (ref 3.5–5.3)
POTASSIUM SERPL-SCNC: 3.7 MMOL/L — SIGNIFICANT CHANGE UP (ref 3.5–5.3)
PROT SERPL-MCNC: 7.1 GM/DL — SIGNIFICANT CHANGE UP (ref 6–8.3)
PROT UR-MCNC: NEGATIVE MG/DL — SIGNIFICANT CHANGE UP
RBC # BLD: 4.36 M/UL — SIGNIFICANT CHANGE UP (ref 3.8–5.2)
RBC # FLD: 10.6 % — SIGNIFICANT CHANGE UP (ref 10.3–14.5)
RBC CASTS # UR COMP ASSIST: (no result) /HPF (ref 0–4)
SODIUM SERPL-SCNC: 142 MMOL/L — SIGNIFICANT CHANGE UP (ref 135–145)
SP GR SPEC: 1 — LOW (ref 1.01–1.02)
TROPONIN I SERPL-MCNC: <0.015 NG/ML — SIGNIFICANT CHANGE UP (ref 0.01–0.04)
UROBILINOGEN FLD QL: NEGATIVE MG/DL — SIGNIFICANT CHANGE UP
WBC # BLD: 8.9 K/UL — SIGNIFICANT CHANGE UP (ref 3.8–10.5)
WBC # FLD AUTO: 8.9 K/UL — SIGNIFICANT CHANGE UP (ref 3.8–10.5)
WBC UR QL: SIGNIFICANT CHANGE UP

## 2017-05-29 PROCEDURE — 99285 EMERGENCY DEPT VISIT HI MDM: CPT

## 2017-05-29 PROCEDURE — 93010 ELECTROCARDIOGRAM REPORT: CPT

## 2017-05-29 RX ORDER — SODIUM CHLORIDE 9 MG/ML
500 INJECTION INTRAMUSCULAR; INTRAVENOUS; SUBCUTANEOUS ONCE
Qty: 0 | Refills: 0 | Status: COMPLETED | OUTPATIENT
Start: 2017-05-29 | End: 2017-05-29

## 2017-05-29 RX ORDER — IPRATROPIUM/ALBUTEROL SULFATE 18-103MCG
3 AEROSOL WITH ADAPTER (GRAM) INHALATION ONCE
Qty: 0 | Refills: 0 | Status: COMPLETED | OUTPATIENT
Start: 2017-05-29 | End: 2017-05-29

## 2017-05-29 RX ORDER — SODIUM CHLORIDE 9 MG/ML
3 INJECTION INTRAMUSCULAR; INTRAVENOUS; SUBCUTANEOUS ONCE
Qty: 0 | Refills: 0 | Status: COMPLETED | OUTPATIENT
Start: 2017-05-29 | End: 2017-05-29

## 2017-05-29 RX ADMIN — SODIUM CHLORIDE 500 MILLILITER(S): 9 INJECTION INTRAMUSCULAR; INTRAVENOUS; SUBCUTANEOUS at 21:16

## 2017-05-29 RX ADMIN — Medication 3 MILLILITER(S): at 21:16

## 2017-05-29 RX ADMIN — Medication 125 MILLIGRAM(S): at 20:36

## 2017-05-29 RX ADMIN — SODIUM CHLORIDE 3 MILLILITER(S): 9 INJECTION INTRAMUSCULAR; INTRAVENOUS; SUBCUTANEOUS at 21:16

## 2017-05-29 NOTE — ED PROVIDER NOTE - DETAILS:
The scribe's documentation has been prepared under my direction and personally reviewed by me in its entirety. I confirm that the note above accurately reflects all work, treatment, procedures, and medical decision making performed by me (Dr. Weinberg).

## 2017-05-29 NOTE — ED PROVIDER NOTE - ENMT, MLM
Neck is non-tender and supple. No accessory muscle use. Airway patent, Nasal mucosa clear. Mouth with normal mucosa. Throat has no vesicles, no oropharyngeal exudates and uvula is midline.

## 2017-05-29 NOTE — ED PROVIDER NOTE - DIAGNOSIS COUNSELING, MDM
conducted a detailed discussion... I had a detailed discussion with the patient and parents regarding the historical points, exam findings, and any diagnostic results supporting the discharge diagnosis.

## 2017-05-29 NOTE — ED STATDOCS - PROGRESS NOTE DETAILS
27 yo F h/o allergies, asthma, hypothyroidism, craniotomy, p/w acute onset dyspnea while sitting down to eat dinner. +Cough. Denies CP, fever, chills. Pt has appt tomorrow for allergy testing so she stopped taking her antihistamines. Will triage patient to Main for further evaluation.

## 2017-05-29 NOTE — ED PROVIDER NOTE - PROGRESS NOTE DETAILS
Dr. khanna:  Pt reports partial though still inadequate sympt. alleviation s/p meds.  Still w/ frequent coughing.  Results of labs d/w pt.  peak flow 350.  Lungs CTA, no retractions, + diffuse wheezing noted when pt coughs. Dr. Weinberg:  Pt sympt. improved, no longer coughing.  No chest tightness.  Lungs + CTA.  Pt, parents agreeable w/ D/C home, outpt f/u w/ own doctors. Dr. Weinberg:  Pt reports partial though still inadequate sympt. alleviation s/p meds.  Still w/ frequent coughing.  Results of labs d/w pt.  peak flow 350.  Lungs CTA, no retractions, + diffuse wheezing noted when pt coughs.

## 2017-05-29 NOTE — ED PROVIDER NOTE - NS ED MD SCRIBE ATTENDING SCRIBE SECTIONS
RESULTS/PAST MEDICAL/SURGICAL/SOCIAL HISTORY/REVIEW OF SYSTEMS/HISTORY OF PRESENT ILLNESS/PHYSICAL EXAM/DISPOSITION/PROGRESS NOTE

## 2017-05-29 NOTE — ED PROVIDER NOTE - CONSTITUTIONAL, MLM
normal... Well appearing, well nourished, white female, alert, frequent dry cough. Mild respiratory discomfort. No sentence shortening.

## 2017-05-29 NOTE — ED PROVIDER NOTE - OBJECTIVE STATEMENT
Exam begun at 20:55. 27 y/o white female with a h/o asthma, GERD, hypothyroid, s/p prior craniotomy regarding left temporal lobe tumor, ambulatory to ED c/o increased coughing and SOB, diffuse anterior chest tightness. Onset of sx about 6:00 PM today. Pt has been off usual anti-histamine x1 week in preparation for allergy testing tomorrow. No fevers, chills, sputum production. Pt states sx are similar to prior asthma exacerbations. No symptomatic relief by home meds. PMD is Dr. Chad Travis. Pt is allergic to keflex, bactrim and trileptal.

## 2017-05-29 NOTE — ED ADULT NURSE NOTE - OBJECTIVE STATEMENT
Pt states she has history of asthma and states that she has taken nebulizer treatments and nothing has helped.  Pt states that she is currently in pulmonary  rehab at [place].  Pt noticed to have cough at this time, Satting 100% room air

## 2017-05-29 NOTE — ED PROVIDER NOTE - MEDICAL DECISION MAKING DETAILS
WF, h/o asthma, off her antihistamines X 1 week in prep of allergy testing tomorrow, p/w increased dry cough, SOB, diffuse ant. chest tightness poorly alleviated by her home meds.  + Wheezing on exam.  IV Solu-Medrol, duoneb txs, check labs (incl. UCG, DD, CE), serial PF assessments.  Observe, reassess.

## 2017-06-01 ENCOUNTER — INPATIENT (INPATIENT)
Facility: HOSPITAL | Age: 27
LOS: 0 days | Discharge: HOME HEALTH | End: 2017-06-02
Attending: FAMILY MEDICINE | Admitting: FAMILY MEDICINE
Payer: COMMERCIAL

## 2017-06-01 VITALS
DIASTOLIC BLOOD PRESSURE: 85 MMHG | RESPIRATION RATE: 20 BRPM | HEIGHT: 63 IN | HEART RATE: 141 BPM | WEIGHT: 160.06 LBS | TEMPERATURE: 99 F | SYSTOLIC BLOOD PRESSURE: 139 MMHG | OXYGEN SATURATION: 100 %

## 2017-06-01 DIAGNOSIS — Z98.890 OTHER SPECIFIED POSTPROCEDURAL STATES: Chronic | ICD-10-CM

## 2017-06-01 DIAGNOSIS — Z86.011 PERSONAL HISTORY OF BENIGN NEOPLASM OF THE BRAIN: Chronic | ICD-10-CM

## 2017-06-01 DIAGNOSIS — Z29.9 ENCOUNTER FOR PROPHYLACTIC MEASURES, UNSPECIFIED: ICD-10-CM

## 2017-06-01 DIAGNOSIS — E03.9 HYPOTHYROIDISM, UNSPECIFIED: ICD-10-CM

## 2017-06-01 DIAGNOSIS — K21.9 GASTRO-ESOPHAGEAL REFLUX DISEASE WITHOUT ESOPHAGITIS: ICD-10-CM

## 2017-06-01 DIAGNOSIS — J45.909 UNSPECIFIED ASTHMA, UNCOMPLICATED: ICD-10-CM

## 2017-06-01 DIAGNOSIS — D72.829 ELEVATED WHITE BLOOD CELL COUNT, UNSPECIFIED: ICD-10-CM

## 2017-06-01 LAB
ALBUMIN SERPL ELPH-MCNC: 3.8 G/DL — SIGNIFICANT CHANGE UP (ref 3.3–5)
ALP SERPL-CCNC: 62 U/L — SIGNIFICANT CHANGE UP (ref 40–120)
ALT FLD-CCNC: 21 U/L — SIGNIFICANT CHANGE UP (ref 12–78)
ANION GAP SERPL CALC-SCNC: 11 MMOL/L — SIGNIFICANT CHANGE UP (ref 5–17)
AST SERPL-CCNC: 16 U/L — SIGNIFICANT CHANGE UP (ref 15–37)
BASOPHILS # BLD AUTO: 0.1 K/UL — SIGNIFICANT CHANGE UP (ref 0–0.2)
BASOPHILS NFR BLD AUTO: 0.5 % — SIGNIFICANT CHANGE UP (ref 0–2)
BILIRUB SERPL-MCNC: 0.3 MG/DL — SIGNIFICANT CHANGE UP (ref 0.2–1.2)
BUN SERPL-MCNC: 15 MG/DL — SIGNIFICANT CHANGE UP (ref 7–23)
CALCIUM SERPL-MCNC: 9.1 MG/DL — SIGNIFICANT CHANGE UP (ref 8.5–10.1)
CHLORIDE SERPL-SCNC: 108 MMOL/L — SIGNIFICANT CHANGE UP (ref 96–108)
CO2 SERPL-SCNC: 21 MMOL/L — LOW (ref 22–31)
CREAT SERPL-MCNC: 1.12 MG/DL — SIGNIFICANT CHANGE UP (ref 0.5–1.3)
EOSINOPHIL # BLD AUTO: 0 K/UL — SIGNIFICANT CHANGE UP (ref 0–0.5)
EOSINOPHIL NFR BLD AUTO: 0.1 % — SIGNIFICANT CHANGE UP (ref 0–6)
GLUCOSE SERPL-MCNC: 125 MG/DL — HIGH (ref 70–99)
HCT VFR BLD CALC: 39.4 % — SIGNIFICANT CHANGE UP (ref 34.5–45)
HGB BLD-MCNC: 14 G/DL — SIGNIFICANT CHANGE UP (ref 11.5–15.5)
LYMPHOCYTES # BLD AUTO: 1.1 K/UL — SIGNIFICANT CHANGE UP (ref 1–3.3)
LYMPHOCYTES # BLD AUTO: 6.5 % — LOW (ref 13–44)
MANUAL DIF COMMENT BLD-IMP: SIGNIFICANT CHANGE UP
MCHC RBC-ENTMCNC: 31.5 PG — SIGNIFICANT CHANGE UP (ref 27–34)
MCHC RBC-ENTMCNC: 35.4 GM/DL — SIGNIFICANT CHANGE UP (ref 32–36)
MCV RBC AUTO: 88.9 FL — SIGNIFICANT CHANGE UP (ref 80–100)
MONOCYTES # BLD AUTO: 0.2 K/UL — SIGNIFICANT CHANGE UP (ref 0–0.9)
MONOCYTES NFR BLD AUTO: 1 % — LOW (ref 2–14)
NEUTROPHILS # BLD AUTO: 15.6 K/UL — HIGH (ref 1.8–7.4)
NEUTROPHILS NFR BLD AUTO: 91.9 % — HIGH (ref 43–77)
PLAT MORPH BLD: NORMAL — SIGNIFICANT CHANGE UP
PLATELET # BLD AUTO: 411 K/UL — HIGH (ref 150–400)
POTASSIUM SERPL-MCNC: 3.5 MMOL/L — SIGNIFICANT CHANGE UP (ref 3.5–5.3)
POTASSIUM SERPL-SCNC: 3.5 MMOL/L — SIGNIFICANT CHANGE UP (ref 3.5–5.3)
PROT SERPL-MCNC: 7.4 GM/DL — SIGNIFICANT CHANGE UP (ref 6–8.3)
RBC # BLD: 4.43 M/UL — SIGNIFICANT CHANGE UP (ref 3.8–5.2)
RBC # FLD: 10.8 % — SIGNIFICANT CHANGE UP (ref 10.3–14.5)
RBC BLD AUTO: SIGNIFICANT CHANGE UP
SODIUM SERPL-SCNC: 140 MMOL/L — SIGNIFICANT CHANGE UP (ref 135–145)
WBC # BLD: 17 K/UL — HIGH (ref 3.8–10.5)
WBC # FLD AUTO: 17 K/UL — HIGH (ref 3.8–10.5)

## 2017-06-01 PROCEDURE — 99285 EMERGENCY DEPT VISIT HI MDM: CPT

## 2017-06-01 PROCEDURE — 71020: CPT | Mod: 26

## 2017-06-01 RX ORDER — BUDESONIDE, MICRONIZED 100 %
2 POWDER (GRAM) MISCELLANEOUS
Qty: 0 | Refills: 0 | Status: DISCONTINUED | OUTPATIENT
Start: 2017-06-01 | End: 2017-06-01

## 2017-06-01 RX ORDER — LEVOTHYROXINE SODIUM 125 MCG
75 TABLET ORAL DAILY
Qty: 0 | Refills: 0 | Status: DISCONTINUED | OUTPATIENT
Start: 2017-06-01 | End: 2017-06-02

## 2017-06-01 RX ORDER — LORATADINE 10 MG/1
10 TABLET ORAL ONCE
Qty: 0 | Refills: 0 | Status: COMPLETED | OUTPATIENT
Start: 2017-06-01 | End: 2017-06-01

## 2017-06-01 RX ORDER — ALBUTEROL 90 UG/1
2.5 AEROSOL, METERED ORAL
Qty: 0 | Refills: 0 | Status: COMPLETED | OUTPATIENT
Start: 2017-06-01 | End: 2017-06-01

## 2017-06-01 RX ORDER — MAGNESIUM SULFATE 500 MG/ML
2 VIAL (ML) INJECTION ONCE
Qty: 0 | Refills: 0 | Status: COMPLETED | OUTPATIENT
Start: 2017-06-01 | End: 2017-06-01

## 2017-06-01 RX ORDER — SODIUM CHLORIDE 9 MG/ML
1000 INJECTION INTRAMUSCULAR; INTRAVENOUS; SUBCUTANEOUS ONCE
Qty: 0 | Refills: 0 | Status: COMPLETED | OUTPATIENT
Start: 2017-06-01 | End: 2017-06-01

## 2017-06-01 RX ORDER — ALBUTEROL 90 UG/1
2.5 AEROSOL, METERED ORAL ONCE
Qty: 0 | Refills: 0 | Status: COMPLETED | OUTPATIENT
Start: 2017-06-01 | End: 2017-06-01

## 2017-06-01 RX ORDER — IPRATROPIUM/ALBUTEROL SULFATE 18-103MCG
3 AEROSOL WITH ADAPTER (GRAM) INHALATION EVERY 6 HOURS
Qty: 0 | Refills: 0 | Status: DISCONTINUED | OUTPATIENT
Start: 2017-06-01 | End: 2017-06-02

## 2017-06-01 RX ORDER — LORATADINE 10 MG/1
10 TABLET ORAL DAILY
Qty: 0 | Refills: 0 | Status: DISCONTINUED | OUTPATIENT
Start: 2017-06-01 | End: 2017-06-02

## 2017-06-01 RX ORDER — BUDESONIDE AND FORMOTEROL FUMARATE DIHYDRATE 160; 4.5 UG/1; UG/1
2 AEROSOL RESPIRATORY (INHALATION)
Qty: 0 | Refills: 0 | Status: DISCONTINUED | OUTPATIENT
Start: 2017-06-01 | End: 2017-06-02

## 2017-06-01 RX ORDER — IPRATROPIUM/ALBUTEROL SULFATE 18-103MCG
3 AEROSOL WITH ADAPTER (GRAM) INHALATION
Qty: 0 | Refills: 0 | Status: DISCONTINUED | OUTPATIENT
Start: 2017-06-01 | End: 2017-06-01

## 2017-06-01 RX ORDER — MONTELUKAST 4 MG/1
10 TABLET, CHEWABLE ORAL DAILY
Qty: 0 | Refills: 0 | Status: DISCONTINUED | OUTPATIENT
Start: 2017-06-01 | End: 2017-06-02

## 2017-06-01 RX ORDER — PANTOPRAZOLE SODIUM 20 MG/1
40 TABLET, DELAYED RELEASE ORAL
Qty: 0 | Refills: 0 | Status: DISCONTINUED | OUTPATIENT
Start: 2017-06-01 | End: 2017-06-02

## 2017-06-01 RX ADMIN — ALBUTEROL 2.5 MILLIGRAM(S): 90 AEROSOL, METERED ORAL at 15:35

## 2017-06-01 RX ADMIN — ALBUTEROL 2.5 MILLIGRAM(S): 90 AEROSOL, METERED ORAL at 20:17

## 2017-06-01 RX ADMIN — Medication 50 GRAM(S): at 15:35

## 2017-06-01 RX ADMIN — SODIUM CHLORIDE 1000 MILLILITER(S): 9 INJECTION INTRAMUSCULAR; INTRAVENOUS; SUBCUTANEOUS at 15:35

## 2017-06-01 RX ADMIN — LORATADINE 10 MILLIGRAM(S): 10 TABLET ORAL at 21:49

## 2017-06-01 RX ADMIN — Medication 125 MILLIGRAM(S): at 15:35

## 2017-06-01 NOTE — ED STATDOCS - NS ED MD SCRIBE ATTENDING SCRIBE SECTIONS
RESULTS/DISPOSITION/PHYSICAL EXAM/REVIEW OF SYSTEMS/HISTORY OF PRESENT ILLNESS/PROGRESS NOTE/PAST MEDICAL/SURGICAL/SOCIAL HISTORY

## 2017-06-01 NOTE — H&P ADULT - RS GEN PE MLT RESP DETAILS PC
no rhonchi/breath sounds equal/no intercostal retractions/good air movement/no chest wall tenderness/clear to auscultation bilaterally/no rales/airway patent/normal/respirations non-labored

## 2017-06-01 NOTE — H&P ADULT - ATTENDING COMMENTS
25 y/o F PMHx significant for severe asthma, HTN, GERD, hypothyroidism, Eamon-Danlos syndrome, IgA deficiency, recent ED visit for asthma exacerbation who presents to the ED w/ c/o worsening symptoms of dyspnea associated w/ wheezing. In the ED the patient was given nebs, and steroids.     1)Acute Asthma Exacerbation  ~admit to Medicine  ~cont. supplemental O2  ~cont. nebs  ~cont. steroids  ~cont. Montelukast  ~f/u w/ Pulmonary    2)Leukocytosis  ~f/u likely steroid-induced  ~f/u PAN C+S  ~f/u repeat labs    3)Hypothyroidism  ~cont. Levothyroxine    4)DVT ppx  ~cont. SCDs

## 2017-06-01 NOTE — H&P ADULT - HISTORY OF PRESENT ILLNESS
25 y/o F with a PMHx of asthma, HTN, GERD, hypothyrodisim presents to the ED c/o asthma exacerbation that occurred earlier today. Recently discharged from ED with similar complaints on 5/31 . She reports she recieved 4 nebulizertreatment and solumedro but only had mild alleviation. Pt currently calm, able to speak without sentence shortening and denies any other acute c/o at this time. She denies fever, chest pain, abdonimil pain, nausea 27 y/o F with a PMHx of severe asthma requiring pulmonary rehab attendance, HTN, GERD, hypothyroidism, Eamon-Danlos syndrome, IgA deficiency, recent ED eval/discharge (5/29) for asthma exacerbation sent home on PO prednisone (40 mg daily) presents to the ED c/o worsening SOB despite 4-5 treatments of combivent and duonebs. Her symptoms included a dry cough but she denies fever, chills, chest pain, tongue swelling, leg pain, recent travel, abdominal pain, nausea, vomiting, diarrhea, and sick contacts. She did not go to [facility] rehab this week b/c of the holiday and has been off her antihistamine in anticipation of allergy testing next week, but she had restarted after her ED visit on Monday. She has never been intubated in the past for her asthma and her last exacerbation requiring hospitalization 5/2016.    In the ED she received 4 rounds of duonebs, MgSo4, and 125 mcg of Solumedrol. She reports the "edge" of her SOB is gone, but still does not feel at baseline. She is currently calm, able to speak full sentence and has her parents at her bedside

## 2017-06-01 NOTE — ED STATDOCS - OBJECTIVE STATEMENT
25 y/o F with a PMHx of asthma presents to the ED c/o asthma exacerbation that occurred earlier today. Pt states that she was recently here in ED on [facility] for the same symptoms. Pt states that she received x4 neb tx, Solumedrol with mild alleviation. Pt currently calm, able to speak without sentence shortening and denies any other acute c/o at this time.

## 2017-06-01 NOTE — ED STATDOCS - MEDICAL DECISION MAKING DETAILS
Pt currently calm, able to speak and answer questioning, presenting cough with plans to receive neb tx and further eval. Pt currently calm, able to speak and answer questioning, presenting cough and wheezing.  Likely asthma exeracbation; low clinical suspicion for pna, pe

## 2017-06-01 NOTE — H&P ADULT - PROBLEM SELECTOR PLAN 2
likely secondary to steroids vs infection  -- check RVP, BCx2  -- CXR shows NAD  -- f/u WBC in AM likely secondary to steroids vs r/o infection  -- check RVP, UA, BCx2  -- CXR shows NAD  -- f/u WBC in AM  -- will monitor off abx

## 2017-06-01 NOTE — ED ADULT TRIAGE NOTE - CHIEF COMPLAINT QUOTE
c/o asthma exacerbation, pt states she received a breathing treatment prior to arrival to ER with no improvements in symptoms

## 2017-06-01 NOTE — H&P ADULT - FAMILY HISTORY
Mother  Still living? Unknown  Family history of hypothyroidism, Age at diagnosis: Age Unknown     Father  Still living? Unknown  Family history of bladder cancer, Age at diagnosis: Age Unknown Mother  Still living? Unknown  Family history of hypothyroidism, Age at diagnosis: Age Unknown     Father  Still living? Yes, Estimated age: Age Unknown  Family history of bladder cancer, Age at diagnosis: Age Unknown

## 2017-06-01 NOTE — H&P ADULT - PSH
History of benign brain tumor  s/p resection 2010 History of benign brain tumor  s/p resection 2010  History of bronchoscopy    History of strabismus surgery

## 2017-06-01 NOTE — ED STATDOCS - PROGRESS NOTE DETAILS
pt seen and reassessed.   wheezing improved but pt states that she is not comfortable going home yet.  will admit to hospitalist  pulmonlogist: Dr. Jolly PEAK flow 370

## 2017-06-01 NOTE — H&P ADULT - PROBLEM SELECTOR PLAN 1
-- admit to medical floor  -- CXR  -- oxygen via NC PRN  -- duonebs  -- s/p 125 solumedrol -- admit to medical floor  -- CXR shows NAD  -- intermittent pulse ox monitoring  -- oxygen via NC PRN  -- duonebs around the clock  -- s/p 125 solumedrol & MGSO4 in ED  -- pulmonary consult - Rik exacerbation of chronic condiition, not hypoxic  -- admit to medical floor  -- CXR shows NAD  -- intermittent pulse ox monitoring  -- oxygen via NC PRN  -- duonebs around the clock  -- s/p 125 solumedrol & MGSO4 in ED  -- continue solumedrol 40 mg IV Qdaily while in patient  -- advair replaced with symbicort (ICS + LABA)  -- continue monteleukast  -- continue claritin  -- pulmonary consult - Rik

## 2017-06-01 NOTE — H&P ADULT - ASSESSMENT
25 y/o F with a PMHx of severe asthma requiring pulmonary rehab attendance, HTN, GERD, hypothyroidism, Eamon-Danlos syndrome, IgA deficiency, recent ED eval/discharge (5/29) for asthma exacerbation sent home on PO prednisone (40 mg daily) presents to the ED c/o worsening SOB despite 4-5 treatments of combivent and duonebs.

## 2017-06-01 NOTE — ED ADULT NURSE NOTE - BREATHING, MLM
[facility] in [address] [facility]    Phone:  [phone]    Fax:  [address]   MRN: [id number]    Department:  [facility] in [place]   Date of Visi a detailed feedback survey mailed to them a week after the visit. If you receive this, we would really appreciate it if you could take the time to complete it. Thank you! You were examined and treated today on an urgent basis only.   This was not a [facility] ([address]) [facility] [address] [facility]. ([address]) [facility] [address] ([place]arina [address]) [address]  [place] ([facility] [address] medical emergencies, dial 911. Spontaneous, unlabored and symmetrical

## 2017-06-01 NOTE — ED STATDOCS - ATTENDING CONTRIBUTION TO CARE
I, Johnathan Anton, performed the initial face to face bedside interview with this patient regarding history of present illness, review of symptoms and relevant past medical, social and family history.  I completed an independent physical examination.  I was the initial provider who evaluated this patient. I have signed out the follow up of any pending tests (i.e. labs, radiological studies) to residentI have communicated the patient’s plan of care and disposition with the resident  The history, relevant review of systems, past medical and surgical history, medical decision making, and physical examination was documented by the scribe in my presence and I attest to the accuracy of the documentation.

## 2017-06-01 NOTE — H&P ADULT - NSHPSOCIALHISTORY_GEN_ALL_CORE
denies tobacco use  denies drug use  denies ETOH us denies tobacco use  denies drug use  denies ETOH use

## 2017-06-01 NOTE — H&P ADULT - NSHPPHYSICALEXAM_GEN_ALL_CORE
Vital Signs Last 24 Hrs  T(C): 36.9, Max: 37.1 (06-01 @ 14:59)  T(F): 98.4, Max: 98.7 (06-01 @ 14:59)  HR: 115 (105 - 141)  BP: 132/80 (117/62 - 139/85)  BP(mean): --  RR: 17 (17 - 20)  SpO2: 100% (100% - 100%)

## 2017-06-01 NOTE — H&P ADULT - PMH
Asthma    GERD (gastroesophageal reflux disease)    Hypothyroid Asthma    Bladder spasm    Eamon-Danlos syndrome, type 3    GERD (gastroesophageal reflux disease)    Hypothyroid    IgA deficiency

## 2017-06-02 VITALS
OXYGEN SATURATION: 100 % | TEMPERATURE: 98 F | SYSTOLIC BLOOD PRESSURE: 115 MMHG | DIASTOLIC BLOOD PRESSURE: 58 MMHG | HEART RATE: 109 BPM | RESPIRATION RATE: 19 BRPM

## 2017-06-02 LAB
ANION GAP SERPL CALC-SCNC: 9 MMOL/L — SIGNIFICANT CHANGE UP (ref 5–17)
BASOPHILS # BLD AUTO: 0.1 K/UL — SIGNIFICANT CHANGE UP (ref 0–0.2)
BASOPHILS NFR BLD AUTO: 0.4 % — SIGNIFICANT CHANGE UP (ref 0–2)
BUN SERPL-MCNC: 11 MG/DL — SIGNIFICANT CHANGE UP (ref 7–23)
CALCIUM SERPL-MCNC: 8.8 MG/DL — SIGNIFICANT CHANGE UP (ref 8.5–10.1)
CHLORIDE SERPL-SCNC: 108 MMOL/L — SIGNIFICANT CHANGE UP (ref 96–108)
CO2 SERPL-SCNC: 23 MMOL/L — SIGNIFICANT CHANGE UP (ref 22–31)
CREAT SERPL-MCNC: 0.71 MG/DL — SIGNIFICANT CHANGE UP (ref 0.5–1.3)
EOSINOPHIL # BLD AUTO: 0 K/UL — SIGNIFICANT CHANGE UP (ref 0–0.5)
EOSINOPHIL NFR BLD AUTO: 0 % — SIGNIFICANT CHANGE UP (ref 0–6)
GLUCOSE SERPL-MCNC: 118 MG/DL — HIGH (ref 70–99)
HCT VFR BLD CALC: 38.3 % — SIGNIFICANT CHANGE UP (ref 34.5–45)
HGB BLD-MCNC: 13.4 G/DL — SIGNIFICANT CHANGE UP (ref 11.5–15.5)
LYMPHOCYTES # BLD AUTO: 1.4 K/UL — SIGNIFICANT CHANGE UP (ref 1–3.3)
LYMPHOCYTES # BLD AUTO: 9 % — LOW (ref 13–44)
MAGNESIUM SERPL-MCNC: 2.8 MG/DL — HIGH (ref 1.6–2.6)
MCHC RBC-ENTMCNC: 31 PG — SIGNIFICANT CHANGE UP (ref 27–34)
MCHC RBC-ENTMCNC: 34.9 GM/DL — SIGNIFICANT CHANGE UP (ref 32–36)
MCV RBC AUTO: 89 FL — SIGNIFICANT CHANGE UP (ref 80–100)
MONOCYTES # BLD AUTO: 0.7 K/UL — SIGNIFICANT CHANGE UP (ref 0–0.9)
MONOCYTES NFR BLD AUTO: 4.5 % — SIGNIFICANT CHANGE UP (ref 2–14)
NEUTROPHILS # BLD AUTO: 13.5 K/UL — HIGH (ref 1.8–7.4)
NEUTROPHILS NFR BLD AUTO: 86.1 % — HIGH (ref 43–77)
PHOSPHATE SERPL-MCNC: 3.5 MG/DL — SIGNIFICANT CHANGE UP (ref 2.5–4.5)
PLATELET # BLD AUTO: 382 K/UL — SIGNIFICANT CHANGE UP (ref 150–400)
POTASSIUM SERPL-MCNC: 3.9 MMOL/L — SIGNIFICANT CHANGE UP (ref 3.5–5.3)
POTASSIUM SERPL-SCNC: 3.9 MMOL/L — SIGNIFICANT CHANGE UP (ref 3.5–5.3)
RAPID RVP RESULT: SIGNIFICANT CHANGE UP
RBC # BLD: 4.3 M/UL — SIGNIFICANT CHANGE UP (ref 3.8–5.2)
RBC # FLD: 10.9 % — SIGNIFICANT CHANGE UP (ref 10.3–14.5)
SODIUM SERPL-SCNC: 140 MMOL/L — SIGNIFICANT CHANGE UP (ref 135–145)
TSH SERPL-MCNC: 0.36 UU/ML — SIGNIFICANT CHANGE UP (ref 0.36–3.74)
WBC # BLD: 15.6 K/UL — HIGH (ref 3.8–10.5)
WBC # FLD AUTO: 15.6 K/UL — HIGH (ref 3.8–10.5)

## 2017-06-02 RX ORDER — FLUTICASONE PROPIONATE AND SALMETEROL 50; 250 UG/1; UG/1
1 POWDER ORAL; RESPIRATORY (INHALATION)
Qty: 0 | Refills: 0 | Status: DISCONTINUED | OUTPATIENT
Start: 2017-06-02 | End: 2017-06-02

## 2017-06-02 RX ORDER — IPRATROPIUM/ALBUTEROL SULFATE 18-103MCG
3 AEROSOL WITH ADAPTER (GRAM) INHALATION
Qty: 20 | Refills: 0 | OUTPATIENT
Start: 2017-06-02 | End: 2017-06-07

## 2017-06-02 RX ORDER — ALBUTEROL 90 UG/1
3 AEROSOL, METERED ORAL
Qty: 20 | Refills: 0 | OUTPATIENT
Start: 2017-06-02

## 2017-06-02 RX ORDER — ALBUTEROL 90 UG/1
3 AEROSOL, METERED ORAL
Qty: 20 | Refills: 0 | OUTPATIENT
Start: 2017-06-02 | End: 2017-06-07

## 2017-06-02 RX ORDER — LORATADINE 10 MG/1
1 TABLET ORAL
Qty: 0 | Refills: 0 | COMMUNITY
Start: 2017-06-02

## 2017-06-02 RX ORDER — BUDESONIDE AND FORMOTEROL FUMARATE DIHYDRATE 160; 4.5 UG/1; UG/1
2 AEROSOL RESPIRATORY (INHALATION)
Qty: 0 | Refills: 0 | Status: DISCONTINUED | OUTPATIENT
Start: 2017-06-02 | End: 2017-06-02

## 2017-06-02 RX ADMIN — PANTOPRAZOLE SODIUM 40 MILLIGRAM(S): 20 TABLET, DELAYED RELEASE ORAL at 06:01

## 2017-06-02 RX ADMIN — Medication 3 MILLILITER(S): at 14:15

## 2017-06-02 RX ADMIN — Medication 40 MILLIGRAM(S): at 05:55

## 2017-06-02 RX ADMIN — Medication 75 MICROGRAM(S): at 08:29

## 2017-06-02 RX ADMIN — Medication 3 MILLILITER(S): at 01:26

## 2017-06-02 RX ADMIN — Medication 3 MILLILITER(S): at 08:27

## 2017-06-02 RX ADMIN — FLUTICASONE PROPIONATE AND SALMETEROL 1 DOSE(S): 50; 250 POWDER ORAL; RESPIRATORY (INHALATION) at 10:11

## 2017-06-02 NOTE — DISCHARGE NOTE ADULT - PATIENT PORTAL LINK FT
“You can access the [facility] Patient Portal, offered by [facility], by registering with the following website: [email or web address]”

## 2017-06-02 NOTE — DISCHARGE NOTE ADULT - CARE PLAN
Goal:	Asthma exacerbation- Improve respiratory status  Instructions for follow-up, activity and diet:	Allow for rest periods; Get out of bed and walk as tolerated; Maintain activity according to your tolerance level. Follow regular diet with drinking plenty of oral fluids. Follow up with [facility] pulmonary office- appointment scheduled Wednesday 06/07/2017 Principal Discharge DX:	Severe persistent asthma with acute exacerbation  Goal:	Asthma exacerbation- Improve respiratory status  Instructions for follow-up, activity and diet:	Allow for rest periods; Get out of bed and walk as tolerated; Maintain activity according to your tolerance level. Follow regular diet with drinking plenty of oral fluids. Follow up with [facility] pulmonary office- appointment scheduled Wednesday 06/07/2017    prednisone 50mg once daily until evaluated by pulmonology  continue nebulizers every 6 hours as needed  continue home medication regimen  Return to ED if symptoms worsen  follow up with pcp

## 2017-06-02 NOTE — DISCHARGE NOTE ADULT - CONDITIONS AT DISCHARGE
VSS-afebrile. Pt alert, active. OOB ambulates room. Voiding. Tolerated regular diet well. Pt denies chest pain, SOB, difficulty breathing. RA sat's above 95%. No distress evident. Duoneb q6hrs tolerated well. Peak flow meter and IS performed well today. Cough- dry noted. No wheezing noted- b/l clear, equal with improved aeration noted.

## 2017-06-02 NOTE — DISCHARGE NOTE ADULT - PLAN OF CARE
Allow for rest periods; Get out of bed and walk as tolerated; Maintain activity according to your tolerance level. Follow regular diet with drinking plenty of oral fluids. Follow up with [facility] pulmonary office- appointment scheduled Wednesday 06/07/2017 Asthma exacerbation- Improve respiratory status Allow for rest periods; Get out of bed and walk as tolerated; Maintain activity according to your tolerance level. Follow regular diet with drinking plenty of oral fluids. Follow up with [facility] pulmonary office- appointment scheduled Wednesday 06/07/2017    prednisone 50mg once daily until evaluated by pulmonology  continue nebulizers every 6 hours as needed  continue home medication regimen  Return to ED if symptoms worsen  follow up with pcp

## 2017-06-02 NOTE — ASU PATIENT PROFILE, ADULT - PMH
Asthma    Bladder spasm    Eamon-Danlos syndrome, type 3    GERD (gastroesophageal reflux disease)    Hypothyroid    IgA deficiency

## 2017-06-02 NOTE — PROGRESS NOTE ADULT - SUBJECTIVE AND OBJECTIVE BOX
CHIEF COMPLAINT: SOB    SUBJECTIVE: Patient seen and examined with Dr. Cade Mercado, Dr. Jerry Salazar and Dr. Angela Pandey on the Family Medicine Teaching Service.  Agree with history, physical, labs and plan which were reviewed in detail.        25 y/o F with a PMHx of severe asthma requiring pulmonary rehab attendance, HTN, GERD, hypothyroidism, Eamon-Danlos syndrome, IgA deficiency, recent ED eval/discharge (5/29) for asthma exacerbation sent home on PO prednisone (40 mg daily) presents to the ED c/o worsening SOB despite 4-5 treatments of combivent and duonebs. Her symptoms included a dry cough but she denies fever, chills, chest pain, tongue swelling, leg pain, recent travel, abdominal pain, nausea, vomiting, diarrhea, and sick contacts. She did not go to [facility] rehab this week b/c of the holiday and has been off her antihistamine in anticipation of allergy testing next week, but she had restarted after her ED visit on Monday. She has never been intubated in the past for her asthma and her last exacerbation requiring hospitalization 5/2016.    In the ED she received 4 rounds of duonebs, MgSo4, and 125 mcg of Solumedrol. She reports the "edge" of her SOB is gone, but still does not feel at baseline. She is currently calm, able to speak full sentence and has her parents at her bedside    6/2  Patient seen and examined and states that her breathing is much improved.  Never been intubated.  Had stopped taking her claritin as she was preparing to have allergy testing done.     REVIEW OF SYSTEMS:    CONSTITUTIONAL: No weakness, fevers or chills  EYES/ENT: No visual changes;  No vertigo or throat pain   NECK: No pain or stiffness  RESPIRATORY: +cough, wheezing, nonhemoptysis; + shortness of breath  CARDIOVASCULAR: No chest pain or palpitations  GASTROINTESTINAL: No abdominal or epigastric pain. No nausea, vomiting, or hematemesis; No diarrhea or constipation. No melena or hematochezia.  GENITOURINARY: No dysuria, frequency or hematuria  NEUROLOGICAL: No numbness or weakness    All other review of systems is negative unless indicated above    Vital Signs Last 24 Hrs  T(C): 36.5, Max: 36.9 (06-01 @ 23:12)  T(F): 97.7, Max: 98.4 (06-01 @ 23:12)  HR: 109 (80 - 116)  BP: 115/58 (97/59 - 132/80)  BP(mean): 70 (69 - 78)  RR: 19 (17 - 26)  SpO2: 100% (98% - 100%)    I&O's Summary  I & Os for 24h ending 02 Jun 2017 07:00  =============================================  IN: 120 ml / OUT: 0 ml / NET: 120 ml    I & Os for current day (as of 02 Jun 2017 22:23)  =============================================  IN: 600 ml / OUT: 0 ml / NET: 600 ml      CAPILLARY BLOOD GLUCOSE      PHYSICAL EXAM:    Constitutional: NAD, awake and alert, well-developed  HEENT:  EOMI, Normal Hearing, MMM  Neck: Soft and supple, No LAD, No JVD  Respiratory: Breath sounds are clear bilaterally, No wheezing, rales or rhonchi, good air entry  Cardiovascular: S1 and S2, regular rate and rhythm, no Murmurs, gallops or rubs  Gastrointestinal: Bowel Sounds present, soft, nontender, nondistended, no guarding, no rebound  Extremities: No peripheral edema  Vascular: 2+ peripheral pulses  Neurological: A/O x 3, no focal deficits  Musculoskeletal: 5/5 strength b/l upper and lower extremities  Skin: No rashes    MEDICATIONS:  MEDICATIONS  (STANDING):  levothyroxine 75MICROGram(s) Oral daily  montelukast 10milliGRAM(s) Oral daily  pantoprazole    Tablet 40milliGRAM(s) Oral before breakfast  ALBUTerol/ipratropium for Nebulization 3milliLiter(s) Nebulizer every 6 hours  methylPREDNISolone sodium succinate Injectable 40milliGRAM(s) IV Push daily  loratadine 10milliGRAM(s) Oral daily  fluticasone / salmeterol 500-50 MICROgram(s) Diskus - 1Dose(s) Inhalation two times a day      LABS: All Labs Reviewed:                        13.4   15.6  )-----------( 382      ( 02 Jun 2017 06:15 )             38.3     06-02    140  |  108  |  11  ----------------------------<  118<H>  3.9   |  23  |  0.71    Ca    8.8      02 Jun 2017 06:15  Phos  3.5     06-02  Mg     2.8     06-02    TPro  7.4  /  Alb  3.8  /  TBili  0.3  /  DBili  x   /  AST  16  /  ALT  21  /  AlkPhos  62  06-01          Blood Culture:     RADIOLOGY/EKG:    DVT PPX:    ADVANCED DIRECTIVE:    DISPOSITION:

## 2017-06-02 NOTE — DISCHARGE NOTE ADULT - CARE PROVIDER_API CALL
Benoit Jolly), Internal Medicine; Pulmonary Disease  [address] [place] [address]  Phone: [phone]  Fax: [phone] Benoit Jolly), Internal Medicine; Pulmonary Disease  [address] [place] [address]  Phone: [phone]  Fax: [phone]

## 2017-06-02 NOTE — DISCHARGE NOTE ADULT - MEDICATION SUMMARY - MEDICATIONS TO TAKE
I will START or STAY ON the medications listed below when I get home from the hospital:    predniSONE 50 mg oral tablet  -- 1 tab(s) by mouth once a day MDD:50mg  -- Indication: For Asthma    Zofran ODT 8 mg oral tablet, disintegrating  -- 1 tab(s) by mouth every 6 hours  -- Indication: For Nausea    Claritin 10 mg oral tablet  -- 1 tab(s) by mouth once a day  -- Indication: For Asthma    ipratropium-albuterol 0.5 mg-2.5 mg/3 mLinhalation solution  -- 3 milliliter(s) inhaled every 6 hours, As Needed  -- For inhalation only.  It is very important that you take or use this exactly as directed.  Do not skip doses or discontinue unless directed by your doctor.  Obtain medical advice before taking any non-prescription drugs as some may affect the action of this medication.    -- Indication: For Asthma    Advair Diskus 500 mcg-50 mcg inhalation powder  -- 1 puff(s) inhaled 2 times a day  -- Indication: For Asthma    Combivent Respimat CFC free 20 mcg-100 mcg/inh inhalation aerosol  -- 1 puff(s) inhaled 4 times a day, As Needed  -- Indication: For Asthma    Singulair 10 mg oral tablet  -- 1 tab(s) by mouth once a day  -- Indication: For Asthma    azelastine-fluticasone 137 mcg-50 mcg/inh nasal spray  -- 1 spray(s) into nose 2 times a day  -- Indication: For Prophylactic measure    omeprazole 40 mg oral delayed release capsule  -- 1 cap(s) by mouth once a day  -- Indication: For GERD (gastroesophageal reflux disease)    Blisovi 24 FE 20 mcg-1 mg oral tablet  -- 1 tab(s) by mouth once a day  -- Indication: For Birthcontrol    Synthroid 75 mcg (0.075 mg) oral tablet  -- 1 tab(s) by mouth once a day  -- Indication: For Hypothyroid

## 2017-06-02 NOTE — PROGRESS NOTE ADULT - PROBLEM SELECTOR PLAN 1
--discharge home   --exacerbation of chronic condiition, not hypoxic  -- CXR shows NAD  -- intermittent pulse ox monitoring  -- oxygen via NC PRN  -- duonebs around the clock  -- s/psolumedrol 40 mg IV Qdaily, discharge on oral prednisone  -- advair replaced with symbicort (ICS + LABA)  -- continue monteleukast  -- continue claritin  -- pulmonary - [facility] as outpatient

## 2017-06-02 NOTE — ASU PATIENT PROFILE, ADULT - PSH
History of benign brain tumor  s/p resection 2010  History of bronchoscopy    History of strabismus surgery

## 2017-06-02 NOTE — PROGRESS NOTE ADULT - ASSESSMENT
25 y/o F with a PMHx of severe asthma requiring pulmonary rehab attendance, HTN, GERD, hypothyroidism, Eamon-Danlos syndrome, IgA deficiency, recent ED eval/discharge (5/29) for asthma exacerbation sent home on PO prednisone (40 mg daily) presents to the ED c/o worsening SOB

## 2017-06-02 NOTE — DISCHARGE NOTE ADULT - HOSPITAL COURSE
HPI:  27 y/o F with a PMHx of severe asthma requiring pulmonary rehab attendance, HTN, GERD, hypothyroidism, Eamon-Danlos syndrome, IgA deficiency, recent ED eval/discharge (5/29) for asthma exacerbation sent home on PO prednisone (40 mg daily) presents to the ED c/o worsening SOB despite 4-5 treatments of combivent and duonebs. Her symptoms included a dry cough but she denies fever, chills, chest pain, tongue swelling, leg pain, recent travel, abdominal pain, nausea, vomiting, diarrhea, and sick contacts. She did not go to [facility] rehab this week b/c of the holiday and has been off her antihistamine in anticipation of allergy testing next week, but she had restarted after her ED visit on Monday. She has never been intubated in the past for her asthma and her last exacerbation requiring hospitalization 5/2016.    In the ED she received 4 rounds of duonebs, MgSo4, and 125 mcg of Solumedrol. She reports the "edge" of her SOB is gone, but still does not feel at baseline. She is currently calm, able to speak full sentence and has her parents at her bedside (01 Jun 2017 21:45)    6/2: pt seen and examined at [facility]. breathing improved. Pt states she will follow up with dr. boudreaux on tuesday. Ready for discharge.

## 2017-06-05 ENCOUNTER — EMERGENCY (EMERGENCY)
Facility: HOSPITAL | Age: 27
LOS: 0 days | Discharge: HOME | End: 2017-06-05
Attending: EMERGENCY MEDICINE | Admitting: EMERGENCY MEDICINE
Payer: COMMERCIAL

## 2017-06-05 VITALS — HEIGHT: 63 IN | WEIGHT: 160.06 LBS

## 2017-06-05 VITALS
OXYGEN SATURATION: 99 % | DIASTOLIC BLOOD PRESSURE: 76 MMHG | TEMPERATURE: 98 F | RESPIRATION RATE: 20 BRPM | SYSTOLIC BLOOD PRESSURE: 129 MMHG | HEART RATE: 99 BPM

## 2017-06-05 DIAGNOSIS — Z98.890 OTHER SPECIFIED POSTPROCEDURAL STATES: Chronic | ICD-10-CM

## 2017-06-05 DIAGNOSIS — Z86.011 PERSONAL HISTORY OF BENIGN NEOPLASM OF THE BRAIN: Chronic | ICD-10-CM

## 2017-06-05 PROCEDURE — 99285 EMERGENCY DEPT VISIT HI MDM: CPT | Mod: 25

## 2017-06-05 RX ORDER — ALBUTEROL 90 UG/1
2.5 AEROSOL, METERED ORAL ONCE
Qty: 0 | Refills: 0 | Status: COMPLETED | OUTPATIENT
Start: 2017-06-05 | End: 2017-06-05

## 2017-06-05 RX ORDER — IPRATROPIUM/ALBUTEROL SULFATE 18-103MCG
3 AEROSOL WITH ADAPTER (GRAM) INHALATION
Qty: 0 | Refills: 0 | Status: COMPLETED | OUTPATIENT
Start: 2017-06-05 | End: 2017-06-05

## 2017-06-05 RX ORDER — IPRATROPIUM/ALBUTEROL SULFATE 18-103MCG
3 AEROSOL WITH ADAPTER (GRAM) INHALATION ONCE
Qty: 0 | Refills: 0 | Status: COMPLETED | OUTPATIENT
Start: 2017-06-05 | End: 2017-06-05

## 2017-06-05 RX ADMIN — ALBUTEROL 2.5 MILLIGRAM(S): 90 AEROSOL, METERED ORAL at 10:50

## 2017-06-05 RX ADMIN — Medication 3 MILLILITER(S): at 04:39

## 2017-06-05 RX ADMIN — Medication 3 MILLILITER(S): at 09:01

## 2017-06-05 RX ADMIN — Medication 125 MILLIGRAM(S): at 04:41

## 2017-06-05 RX ADMIN — Medication 100 MILLIGRAM(S): at 04:50

## 2017-06-05 RX ADMIN — Medication 3 MILLILITER(S): at 04:54

## 2017-06-05 RX ADMIN — Medication 3 MILLILITER(S): at 05:19

## 2017-06-05 NOTE — ED ADULT NURSE REASSESSMENT NOTE - NS ED NURSE REASSESS COMMENT FT1
Assumed care of pt. Pt A&Ox3, calm and denies any pain. Pt states she feels better but still feels a "little tight" Resp even and unlabored. Pt a little SOB, no difficulty breathing.

## 2017-06-05 NOTE — ED PROVIDER NOTE - PROGRESS NOTE DETAILS
no experiatory wheeze and pt tolerating po and no restractions will observe pt for additional 3 hrs

## 2017-06-05 NOTE — ED PROVIDER NOTE - FAMILY HISTORY
Mother  Still living? Unknown  Family history of hypothyroidism, Age at diagnosis: Age Unknown     Father  Still living? Yes, Estimated age: Age Unknown  Family history of bladder cancer, Age at diagnosis: Age Unknown

## 2017-06-05 NOTE — ED ADULT NURSE REASSESSMENT NOTE - NS ED NURSE REASSESS COMMENT FT1
0600: Nurse Rounding: Patient is sleeping at time of rounding, no apparent distress, complaints, or comfort measures needed at this time. Will continue to monitor/reassess and ensure comfort/safety.

## 2017-06-05 NOTE — ED PROVIDER NOTE - NS ED MD SCRIBE ATTENDING SCRIBE SECTIONS
HISTORY OF PRESENT ILLNESS/PROGRESS NOTE/DISPOSITION/PHYSICAL EXAM/RESULTS/PAST MEDICAL/SURGICAL/SOCIAL HISTORY/REVIEW OF SYSTEMS

## 2017-06-05 NOTE — ED ADULT NURSE NOTE - CHIEF COMPLAINT QUOTE
pt states she used albuterol and duoneb neb at home and still not getting enough relief. cough. denies every needing to be intubated.

## 2017-06-05 NOTE — ED PROVIDER NOTE - DETAILS:
Return to the ER immediately for any worsening symptoms, concerns, chest pain, fevers, shortness of breath, vomiting, abdominal pain, rashes, neck pain, back pain, numbness, paresthesias, pain or any difficulties at all.  Please follow up with your own private physician or our medical clinic at [phone] in the next 2-3 days.  Find a doctor at [phone].

## 2017-06-05 NOTE — ED PROVIDER NOTE - OBJECTIVE STATEMENT
27 yo F h/o asthma presenting with asthma exacerbation x 1 week s/p discontinuation of antihistamines. Pt c/o SOB, cough, nasal congestion. PSHx: eye surgery. No relevant fhx.

## 2017-06-05 NOTE — ED ADULT NURSE NOTE - OBJECTIVE STATEMENT
26 year old female presenting to the ED complaining of an asthma attack. Patient reports a past history of severe asthma, stating that she has had an exacerbation over the past week (beginning 5/29) with her asthma after discontinuing her antihistamines in order to have an allergy scratch test. Patient states that she has been seen in the ED as well as admitted twice over the past week due to this exacerbation. Patient reports that she has been using home nebulizers of Albuterol/Atrovent every 6 hours with oral steroids, but has not been having relief.   Upon initial assessment, patient has a moderate frequency non-productive cough with a slight exhalation wheeze. Upon ascultation, patient is moving air well across all lung fields. Patient is conversive and speaking in full sentence, with an SPO2 of 100%.

## 2017-06-06 DIAGNOSIS — J45.901 UNSPECIFIED ASTHMA WITH (ACUTE) EXACERBATION: ICD-10-CM

## 2017-06-07 ENCOUNTER — APPOINTMENT (OUTPATIENT)
Dept: INTERNAL MEDICINE | Facility: CLINIC | Age: 27
End: 2017-06-07

## 2017-06-07 ENCOUNTER — MED ADMIN CHARGE (OUTPATIENT)
Age: 27
End: 2017-06-07

## 2017-06-07 VITALS
OXYGEN SATURATION: 99 % | HEIGHT: 63 IN | DIASTOLIC BLOOD PRESSURE: 84 MMHG | WEIGHT: 162 LBS | TEMPERATURE: 97.9 F | HEART RATE: 117 BPM | BODY MASS INDEX: 28.7 KG/M2 | SYSTOLIC BLOOD PRESSURE: 102 MMHG | RESPIRATION RATE: 16 BRPM

## 2017-06-07 DIAGNOSIS — J45.901 UNSPECIFIED ASTHMA WITH (ACUTE) EXACERBATION: ICD-10-CM

## 2017-06-07 DIAGNOSIS — D80.6 ANTIBODY DEFICIENCY WITH NEAR-NORMAL IMMUNOGLOBULINS OR WITH HYPERIMMUNOGLOBULINEMIA: ICD-10-CM

## 2017-06-07 DIAGNOSIS — J45.31 MILD PERSISTENT ASTHMA WITH (ACUTE) EXACERBATION: ICD-10-CM

## 2017-06-07 DIAGNOSIS — Q79.6 EHLERS-DANLOS SYNDROMES: ICD-10-CM

## 2017-06-07 DIAGNOSIS — I10 ESSENTIAL (PRIMARY) HYPERTENSION: ICD-10-CM

## 2017-06-07 DIAGNOSIS — R00.0 TACHYCARDIA, UNSPECIFIED: ICD-10-CM

## 2017-06-07 DIAGNOSIS — K21.9 GASTRO-ESOPHAGEAL REFLUX DISEASE WITHOUT ESOPHAGITIS: ICD-10-CM

## 2017-06-07 DIAGNOSIS — J45.909 UNSPECIFIED ASTHMA, UNCOMPLICATED: ICD-10-CM

## 2017-06-07 DIAGNOSIS — B00.9 HERPESVIRAL INFECTION, UNSPECIFIED: ICD-10-CM

## 2017-06-07 DIAGNOSIS — H50.9 UNSPECIFIED STRABISMUS: ICD-10-CM

## 2017-06-07 DIAGNOSIS — E03.9 HYPOTHYROIDISM, UNSPECIFIED: ICD-10-CM

## 2017-06-07 DIAGNOSIS — T38.0X5A ADVERSE EFFECT OF GLUCOCORTICOIDS AND SYNTHETIC ANALOGUES, INITIAL ENCOUNTER: ICD-10-CM

## 2017-06-07 DIAGNOSIS — J34.89 OTHER SPECIFIED DISORDERS OF NOSE AND NASAL SINUSES: ICD-10-CM

## 2017-06-07 DIAGNOSIS — J45.30 MILD PERSISTENT ASTHMA, UNCOMPLICATED: ICD-10-CM

## 2017-06-07 DIAGNOSIS — Z88.9 ALLERGY STATUS TO UNSPECIFIED DRUGS, MEDICAMENTS AND BIOLOGICAL SUBSTANCES: ICD-10-CM

## 2017-06-07 DIAGNOSIS — J39.8 OTHER SPECIFIED DISEASES OF UPPER RESPIRATORY TRACT: ICD-10-CM

## 2017-06-07 LAB
CULTURE RESULTS: SIGNIFICANT CHANGE UP
SPECIMEN SOURCE: SIGNIFICANT CHANGE UP

## 2017-06-07 RX ORDER — PREDNISONE 50 MG/1
50 TABLET ORAL
Qty: 5 | Refills: 0 | Status: COMPLETED | COMMUNITY
Start: 2017-06-02

## 2017-06-07 RX ORDER — CIPROFLOXACIN 212.6; 287.5 MG/1; MG/1
500 TABLET, FILM COATED, EXTENDED RELEASE ORAL
Qty: 7 | Refills: 0 | Status: COMPLETED | COMMUNITY
Start: 2016-12-17

## 2017-06-07 RX ORDER — VALACYCLOVIR 500 MG/1
500 TABLET, FILM COATED ORAL
Qty: 28 | Refills: 0 | Status: COMPLETED | COMMUNITY
Start: 2017-01-28

## 2017-06-07 RX ORDER — DEXTROAMPHETAMINE SACCHARATE, AMPHETAMINE ASPARTATE, DEXTROAMPHETAMINE SULFATE AND AMPHETAMINE SULFATE 2.5; 2.5; 2.5; 2.5 MG/1; MG/1; MG/1; MG/1
10 TABLET ORAL
Qty: 90 | Refills: 0 | Status: COMPLETED | COMMUNITY
Start: 2016-12-22

## 2017-06-07 RX ORDER — AZITHROMYCIN 250 MG/1
250 TABLET, FILM COATED ORAL
Qty: 6 | Refills: 0 | Status: COMPLETED | COMMUNITY
Start: 2016-12-22

## 2017-06-07 RX ORDER — ALBUTEROL SULFATE 0.63 MG/3ML
0.63 SOLUTION RESPIRATORY (INHALATION)
Qty: 75 | Refills: 0 | Status: COMPLETED | COMMUNITY
Start: 2017-06-02

## 2017-06-07 RX ORDER — CODEINE PHOSPHATE AND GUAIFENESIN 10; 100 MG/5ML; MG/5ML
100-10 LIQUID ORAL
Refills: 0 | Status: COMPLETED | COMMUNITY
End: 2017-06-07

## 2017-06-07 RX ORDER — FLUTICASONE PROPIONATE AND SALMETEROL 50; 500 UG/1; UG/1
500-50 POWDER RESPIRATORY (INHALATION)
Qty: 60 | Refills: 0 | Status: COMPLETED | COMMUNITY
Start: 2017-01-09

## 2017-06-07 RX ORDER — ONDANSETRON 8 MG/1
8 TABLET, ORALLY DISINTEGRATING ORAL
Qty: 16 | Refills: 0 | Status: COMPLETED | COMMUNITY
Start: 2017-03-21

## 2017-06-07 RX ORDER — METRONIDAZOLE 500 MG/1
500 TABLET ORAL
Qty: 20 | Refills: 0 | Status: COMPLETED | COMMUNITY
Start: 2017-03-17

## 2017-06-07 RX ORDER — TRIAMCINOLONE ACETONIDE 40 MG/ML
40 INJECTION, SUSPENSION INTRA-ARTICULAR; INTRAMUSCULAR
Qty: 1 | Refills: 0 | Status: COMPLETED | OUTPATIENT
Start: 2017-06-07

## 2017-06-07 RX ORDER — CLINDAMYCIN HYDROCHLORIDE 150 MG/1
150 CAPSULE ORAL
Qty: 20 | Refills: 0 | Status: COMPLETED | COMMUNITY
Start: 2016-12-29

## 2017-06-07 RX ADMIN — TRIAMCINOLONE ACETONIDE 2 MG/ML: 40 INJECTION, SUSPENSION INTRA-ARTICULAR; INTRAMUSCULAR at 00:00

## 2017-07-07 ENCOUNTER — NON-APPOINTMENT (OUTPATIENT)
Age: 27
End: 2017-07-07

## 2017-07-07 ENCOUNTER — APPOINTMENT (OUTPATIENT)
Dept: INTERNAL MEDICINE | Facility: CLINIC | Age: 27
End: 2017-07-07

## 2017-07-07 VITALS
OXYGEN SATURATION: 98 % | WEIGHT: 159.99 LBS | BODY MASS INDEX: 28.35 KG/M2 | HEIGHT: 63 IN | HEART RATE: 88 BPM | TEMPERATURE: 98.6 F | SYSTOLIC BLOOD PRESSURE: 112 MMHG | RESPIRATION RATE: 16 BRPM | DIASTOLIC BLOOD PRESSURE: 72 MMHG

## 2017-07-13 ENCOUNTER — MEDICATION RENEWAL (OUTPATIENT)
Age: 27
End: 2017-07-13

## 2017-07-13 RX ORDER — FLUTICASONE PROPIONATE AND SALMETEROL XINAFOATE 230; 21 UG/1; UG/1
230-21 AEROSOL, METERED RESPIRATORY (INHALATION)
Qty: 3 | Refills: 3 | Status: COMPLETED | COMMUNITY
Start: 2017-07-07 | End: 2017-07-13

## 2017-08-28 RX ORDER — MONTELUKAST 4 MG/1
1 TABLET, CHEWABLE ORAL
Qty: 0 | Refills: 0 | COMMUNITY

## 2017-08-28 RX ORDER — NORETHINDRONE AND ETHINYL ESTRADIOL 0.4-0.035
1 KIT ORAL
Qty: 0 | Refills: 0 | COMMUNITY

## 2017-08-28 RX ORDER — AZELASTINE HYDROCHLORIDE AND FLUTICASONE PROPIONATE 137; 50 UG/1; UG/1
1 SPRAY, METERED NASAL
Qty: 0 | Refills: 0 | COMMUNITY

## 2017-08-28 RX ORDER — OMEPRAZOLE 10 MG/1
1 CAPSULE, DELAYED RELEASE ORAL
Qty: 0 | Refills: 0 | COMMUNITY

## 2017-08-28 RX ORDER — IPRATROPIUM/ALBUTEROL SULFATE 18-103MCG
1 AEROSOL WITH ADAPTER (GRAM) INHALATION
Qty: 0 | Refills: 0 | COMMUNITY

## 2017-08-28 RX ORDER — LEVOTHYROXINE SODIUM 125 MCG
1 TABLET ORAL
Qty: 0 | Refills: 0 | COMMUNITY

## 2017-08-28 RX ORDER — FLUTICASONE PROPIONATE AND SALMETEROL 50; 250 UG/1; UG/1
1 POWDER ORAL; RESPIRATORY (INHALATION)
Qty: 0 | Refills: 0 | COMMUNITY

## 2017-08-31 ENCOUNTER — APPOINTMENT (OUTPATIENT)
Dept: INTERNAL MEDICINE | Facility: CLINIC | Age: 27
End: 2017-08-31
Payer: COMMERCIAL

## 2017-08-31 VITALS
BODY MASS INDEX: 28.7 KG/M2 | DIASTOLIC BLOOD PRESSURE: 80 MMHG | HEIGHT: 63 IN | SYSTOLIC BLOOD PRESSURE: 122 MMHG | OXYGEN SATURATION: 99 % | RESPIRATION RATE: 16 BRPM | WEIGHT: 162 LBS | TEMPERATURE: 98.3 F

## 2017-08-31 DIAGNOSIS — Z88.9 ALLERGY STATUS TO UNSPECIFIED DRUGS, MEDICAMENTS AND BIOLOGICAL SUBSTANCES: ICD-10-CM

## 2017-08-31 DIAGNOSIS — J34.1 CYST AND MUCOCELE OF NOSE AND NASAL SINUS: ICD-10-CM

## 2017-08-31 PROCEDURE — 99214 OFFICE O/P EST MOD 30 MIN: CPT | Mod: 25

## 2017-08-31 PROCEDURE — 94010 BREATHING CAPACITY TEST: CPT

## 2017-08-31 RX ORDER — ALPRAZOLAM 0.25 MG/1
0.25 TABLET ORAL
Refills: 0 | Status: COMPLETED | COMMUNITY
End: 2017-08-31

## 2017-08-31 RX ORDER — CODEINE PHOSPHATE AND GUAIFENESIN 10; 100 MG/5ML; MG/5ML
100-10 LIQUID ORAL
Qty: 240 | Refills: 0 | Status: COMPLETED | COMMUNITY
Start: 2017-06-07 | End: 2017-08-31

## 2017-10-01 ENCOUNTER — TRANSCRIPTION ENCOUNTER (OUTPATIENT)
Age: 27
End: 2017-10-01

## 2017-10-16 ENCOUNTER — RECORD ABSTRACTING (OUTPATIENT)
Age: 27
End: 2017-10-16

## 2017-10-16 DIAGNOSIS — Z78.9 OTHER SPECIFIED HEALTH STATUS: ICD-10-CM

## 2017-10-16 DIAGNOSIS — G43.909 MIGRAINE, UNSPECIFIED, NOT INTRACTABLE, W/OUT STATUS MIGRAINOSUS: ICD-10-CM

## 2017-10-16 DIAGNOSIS — G40.A09 ABSENCE EPILEPTIC SYNDROME, NOT INTRACTABLE, W/OUT STATUS EPILEPTICUS: ICD-10-CM

## 2017-10-17 ENCOUNTER — APPOINTMENT (OUTPATIENT)
Dept: INTERNAL MEDICINE | Facility: CLINIC | Age: 27
End: 2017-10-17
Payer: COMMERCIAL

## 2017-10-17 VITALS
HEIGHT: 63 IN | HEART RATE: 101 BPM | SYSTOLIC BLOOD PRESSURE: 130 MMHG | WEIGHT: 153 LBS | OXYGEN SATURATION: 96 % | TEMPERATURE: 98.2 F | BODY MASS INDEX: 27.11 KG/M2 | DIASTOLIC BLOOD PRESSURE: 86 MMHG | RESPIRATION RATE: 18 BRPM

## 2017-10-17 DIAGNOSIS — R91.1 SOLITARY PULMONARY NODULE: ICD-10-CM

## 2017-10-17 DIAGNOSIS — J82 PULMONARY EOSINOPHILIA, NOT ELSEWHERE CLASSIFIED: ICD-10-CM

## 2017-10-17 DIAGNOSIS — G47.33 OBSTRUCTIVE SLEEP APNEA (ADULT) (PEDIATRIC): ICD-10-CM

## 2017-10-17 PROCEDURE — 99214 OFFICE O/P EST MOD 30 MIN: CPT

## 2017-11-10 ENCOUNTER — APPOINTMENT (OUTPATIENT)
Dept: INTERNAL MEDICINE | Facility: CLINIC | Age: 27
End: 2017-11-10

## 2017-11-21 ENCOUNTER — NON-APPOINTMENT (OUTPATIENT)
Age: 27
End: 2017-11-21

## 2017-11-21 ENCOUNTER — APPOINTMENT (OUTPATIENT)
Dept: INTERNAL MEDICINE | Facility: CLINIC | Age: 27
End: 2017-11-21
Payer: COMMERCIAL

## 2017-11-21 VITALS
WEIGHT: 143.98 LBS | RESPIRATION RATE: 16 BRPM | HEART RATE: 108 BPM | HEIGHT: 62 IN | BODY MASS INDEX: 26.5 KG/M2 | OXYGEN SATURATION: 98 % | DIASTOLIC BLOOD PRESSURE: 72 MMHG | TEMPERATURE: 98.2 F | SYSTOLIC BLOOD PRESSURE: 110 MMHG

## 2017-11-21 PROCEDURE — 94060 EVALUATION OF WHEEZING: CPT

## 2017-11-21 PROCEDURE — 99214 OFFICE O/P EST MOD 30 MIN: CPT | Mod: 25

## 2017-11-21 RX ORDER — LEVOFLOXACIN 500 MG/1
500 TABLET, FILM COATED ORAL
Qty: 7 | Refills: 0 | Status: COMPLETED | COMMUNITY
Start: 2017-10-17 | End: 2017-11-21

## 2017-11-21 RX ORDER — PREDNISONE 20 MG/1
20 TABLET ORAL
Qty: 12 | Refills: 0 | Status: COMPLETED | COMMUNITY
Start: 2017-10-17 | End: 2017-11-21

## 2017-11-30 ENCOUNTER — APPOINTMENT (OUTPATIENT)
Dept: INTERNAL MEDICINE | Facility: CLINIC | Age: 27
End: 2017-11-30
Payer: COMMERCIAL

## 2017-11-30 ENCOUNTER — NON-APPOINTMENT (OUTPATIENT)
Age: 27
End: 2017-11-30

## 2017-11-30 ENCOUNTER — APPOINTMENT (OUTPATIENT)
Dept: PULMONOLOGY | Facility: CLINIC | Age: 27
End: 2017-11-30

## 2017-11-30 VITALS
BODY MASS INDEX: 26.5 KG/M2 | DIASTOLIC BLOOD PRESSURE: 66 MMHG | HEART RATE: 98 BPM | WEIGHT: 144 LBS | TEMPERATURE: 98.6 F | HEIGHT: 62 IN | OXYGEN SATURATION: 99 % | SYSTOLIC BLOOD PRESSURE: 114 MMHG | RESPIRATION RATE: 16 BRPM

## 2017-11-30 DIAGNOSIS — K21.9 GASTRO-ESOPHAGEAL REFLUX DISEASE W/OUT ESOPHAGITIS: ICD-10-CM

## 2017-11-30 PROCEDURE — 99213 OFFICE O/P EST LOW 20 MIN: CPT | Mod: 25

## 2017-11-30 PROCEDURE — 94010 BREATHING CAPACITY TEST: CPT

## 2017-11-30 RX ORDER — LEVOFLOXACIN 500 MG/1
500 TABLET, FILM COATED ORAL
Qty: 7 | Refills: 0 | Status: DISCONTINUED | COMMUNITY
Start: 2017-11-21 | End: 2017-11-30

## 2017-11-30 RX ORDER — METHYLPREDNISOLONE 4 MG/1
4 TABLET ORAL
Qty: 1 | Refills: 0 | Status: DISCONTINUED | COMMUNITY
Start: 2017-11-21 | End: 2017-11-30

## 2017-12-01 ENCOUNTER — APPOINTMENT (OUTPATIENT)
Dept: INTERNAL MEDICINE | Facility: CLINIC | Age: 27
End: 2017-12-01

## 2017-12-06 ENCOUNTER — APPOINTMENT (OUTPATIENT)
Dept: INTERNAL MEDICINE | Facility: CLINIC | Age: 27
End: 2017-12-06
Payer: COMMERCIAL

## 2017-12-06 ENCOUNTER — NON-APPOINTMENT (OUTPATIENT)
Age: 27
End: 2017-12-06

## 2017-12-06 VITALS
OXYGEN SATURATION: 99 % | TEMPERATURE: 97.9 F | HEIGHT: 62 IN | BODY MASS INDEX: 26.68 KG/M2 | HEART RATE: 102 BPM | SYSTOLIC BLOOD PRESSURE: 110 MMHG | RESPIRATION RATE: 16 BRPM | WEIGHT: 145 LBS | DIASTOLIC BLOOD PRESSURE: 70 MMHG

## 2017-12-06 PROCEDURE — 94060 EVALUATION OF WHEEZING: CPT

## 2017-12-06 PROCEDURE — 99214 OFFICE O/P EST MOD 30 MIN: CPT | Mod: 25

## 2017-12-06 RX ORDER — PREDNISONE 20 MG/1
20 TABLET ORAL
Qty: 12 | Refills: 0 | Status: DISCONTINUED | COMMUNITY
Start: 2017-11-30 | End: 2017-12-06

## 2017-12-20 ENCOUNTER — RX RENEWAL (OUTPATIENT)
Age: 27
End: 2017-12-20

## 2018-01-29 ENCOUNTER — MEDICATION RENEWAL (OUTPATIENT)
Age: 28
End: 2018-01-29

## 2018-02-13 ENCOUNTER — APPOINTMENT (OUTPATIENT)
Dept: INTERNAL MEDICINE | Facility: CLINIC | Age: 28
End: 2018-02-13
Payer: COMMERCIAL

## 2018-02-13 ENCOUNTER — NON-APPOINTMENT (OUTPATIENT)
Age: 28
End: 2018-02-13

## 2018-02-13 VITALS
HEART RATE: 76 BPM | RESPIRATION RATE: 14 BRPM | BODY MASS INDEX: 25.4 KG/M2 | WEIGHT: 138 LBS | HEIGHT: 62 IN | DIASTOLIC BLOOD PRESSURE: 65 MMHG | OXYGEN SATURATION: 99 % | TEMPERATURE: 98 F | SYSTOLIC BLOOD PRESSURE: 118 MMHG

## 2018-02-13 PROCEDURE — 94060 EVALUATION OF WHEEZING: CPT

## 2018-02-13 PROCEDURE — 99213 OFFICE O/P EST LOW 20 MIN: CPT | Mod: 25

## 2018-02-14 ENCOUNTER — RX RENEWAL (OUTPATIENT)
Age: 28
End: 2018-02-14

## 2018-02-15 ENCOUNTER — EMERGENCY (EMERGENCY)
Facility: HOSPITAL | Age: 28
LOS: 0 days | Discharge: HOME | End: 2018-02-15
Attending: EMERGENCY MEDICINE | Admitting: EMERGENCY MEDICINE
Payer: COMMERCIAL

## 2018-02-15 VITALS
HEART RATE: 107 BPM | DIASTOLIC BLOOD PRESSURE: 78 MMHG | OXYGEN SATURATION: 100 % | SYSTOLIC BLOOD PRESSURE: 139 MMHG | TEMPERATURE: 98 F | RESPIRATION RATE: 19 BRPM

## 2018-02-15 VITALS
OXYGEN SATURATION: 100 % | HEIGHT: 63 IN | TEMPERATURE: 99 F | DIASTOLIC BLOOD PRESSURE: 81 MMHG | SYSTOLIC BLOOD PRESSURE: 142 MMHG | WEIGHT: 138.01 LBS | HEART RATE: 132 BPM | RESPIRATION RATE: 20 BRPM

## 2018-02-15 DIAGNOSIS — J45.901 UNSPECIFIED ASTHMA WITH (ACUTE) EXACERBATION: ICD-10-CM

## 2018-02-15 DIAGNOSIS — Z86.011 PERSONAL HISTORY OF BENIGN NEOPLASM OF THE BRAIN: Chronic | ICD-10-CM

## 2018-02-15 DIAGNOSIS — Z98.890 OTHER SPECIFIED POSTPROCEDURAL STATES: Chronic | ICD-10-CM

## 2018-02-15 PROCEDURE — 71046 X-RAY EXAM CHEST 2 VIEWS: CPT | Mod: 26

## 2018-02-15 PROCEDURE — 99284 EMERGENCY DEPT VISIT MOD MDM: CPT

## 2018-02-15 RX ORDER — SODIUM CHLORIDE 9 MG/ML
3 INJECTION INTRAMUSCULAR; INTRAVENOUS; SUBCUTANEOUS EVERY 8 HOURS
Qty: 0 | Refills: 0 | Status: DISCONTINUED | OUTPATIENT
Start: 2018-02-15 | End: 2018-02-15

## 2018-02-15 RX ORDER — IPRATROPIUM/ALBUTEROL SULFATE 18-103MCG
3 AEROSOL WITH ADAPTER (GRAM) INHALATION ONCE
Qty: 0 | Refills: 0 | Status: COMPLETED | OUTPATIENT
Start: 2018-02-15 | End: 2018-02-15

## 2018-02-15 RX ADMIN — Medication 3 MILLILITER(S): at 18:22

## 2018-02-15 RX ADMIN — Medication 125 MILLIGRAM(S): at 18:39

## 2018-02-15 NOTE — ED ADULT NURSE NOTE - OBJECTIVE STATEMENT
Pt presents to ED c/o asthma exacerbation. Pt reports she is on Prednisone 20mg BID and Z pack for URI.

## 2018-02-15 NOTE — ED STATDOCS - OBJECTIVE STATEMENT
26 yo F with PMHx of asthma presents to ED reporting recent URI symptoms resulting in an exacerbation of her asthma.  She is taking prednisone and her regular medications daily but symptoms are more severe at this time.  She is followed by Dr. Jolly. 27 year old female with PMHx of asthma presents to ED reporting recent URI symptoms resulting in an exacerbation of her asthma complaining of wheezing and sob that is usual for her asthma exacerbation. No cp, abodminal pain, vaginal bleeding, melena or hematochezia, dysuria or hematuria.  She is taking prednisone and her regular medications daily but symptoms are more severe at this time.  She is followed by Dr. Jolly.

## 2018-02-15 NOTE — ED STATDOCS - MEDICAL DECISION MAKING DETAILS
Marito DALEY: Outpatient follow up and instructions to return if any worsening of symptoms if unable to secure an appointment with PMD or if any emergent or health concerns provided.

## 2018-02-15 NOTE — ED STATDOCS - PROGRESS NOTE DETAILS
Patient reassessed.  No wheezing, feeling better but not 100%.  Not tachycardic, no tachypneic, comfortably breathing with intermittent dry cough, spo2 is 100% on room air.  Patient comfortable to go home.  Reviewed strict return precautions and symptom care and patient will follow up with Dr. Rik Peres PA-C Patient reassessed.  No wheezing, lungs CTAB, feeling better but not 100%.  Not tachycardic, no tachypneic, comfortably breathing with intermittent dry cough, spo2 is 100% on room air.  Patient comfortable to go home.  Reviewed strict return precautions and symptom care and patient will follow up with Dr. Rik Peres PA-C

## 2018-02-15 NOTE — ED STATDOCS - ATTENDING CONTRIBUTION TO CARE
I Erick Devi MD saw and examined the patient. MLP saw and examined the patient under my supervision. I discussed the care of the patient with MLP and agree with MLP's plan, assessment and care of the patient while in the ED.

## 2018-03-05 ENCOUNTER — MEDICATION RENEWAL (OUTPATIENT)
Age: 28
End: 2018-03-05

## 2018-03-26 ENCOUNTER — RX RENEWAL (OUTPATIENT)
Age: 28
End: 2018-03-26

## 2018-04-30 ENCOUNTER — APPOINTMENT (OUTPATIENT)
Dept: INTERNAL MEDICINE | Facility: CLINIC | Age: 28
End: 2018-04-30
Payer: COMMERCIAL

## 2018-04-30 ENCOUNTER — NON-APPOINTMENT (OUTPATIENT)
Age: 28
End: 2018-04-30

## 2018-04-30 VITALS
WEIGHT: 138 LBS | BODY MASS INDEX: 24.45 KG/M2 | HEART RATE: 98 BPM | DIASTOLIC BLOOD PRESSURE: 62 MMHG | HEIGHT: 63 IN | OXYGEN SATURATION: 98 % | SYSTOLIC BLOOD PRESSURE: 128 MMHG | TEMPERATURE: 98.8 F | RESPIRATION RATE: 16 BRPM

## 2018-04-30 PROCEDURE — 99214 OFFICE O/P EST MOD 30 MIN: CPT | Mod: 25

## 2018-04-30 PROCEDURE — 94060 EVALUATION OF WHEEZING: CPT

## 2018-04-30 RX ORDER — PREDNISONE 20 MG/1
20 TABLET ORAL
Qty: 12 | Refills: 0 | Status: DISCONTINUED | COMMUNITY
Start: 2018-02-13 | End: 2018-04-30

## 2018-04-30 RX ORDER — ALPRAZOLAM 0.5 MG/1
0.5 TABLET ORAL
Qty: 60 | Refills: 0 | Status: ACTIVE | COMMUNITY
Start: 2017-06-19

## 2018-04-30 RX ORDER — DEXTROAMPHETAMINE SULFATE, DEXTROAMPHETAMINE SACCHARATE, AMPHETAMINE SULFATE AND AMPHETAMINE ASPARTATE 6.25; 6.25; 6.25; 6.25 MG/1; MG/1; MG/1; MG/1
25 CAPSULE, EXTENDED RELEASE ORAL
Refills: 0 | Status: ACTIVE | COMMUNITY

## 2018-04-30 RX ORDER — CODEINE PHOSPHATE AND GUAIFENESIN 10; 100 MG/5ML; MG/5ML
100-10 LIQUID ORAL
Qty: 320 | Refills: 0 | Status: DISCONTINUED | COMMUNITY
Start: 2017-11-30 | End: 2018-04-30

## 2018-04-30 RX ORDER — AZITHROMYCIN DIHYDRATE 250 MG/1
250 TABLET, FILM COATED ORAL
Qty: 6 | Refills: 0 | Status: DISCONTINUED | COMMUNITY
Start: 2018-02-13 | End: 2018-04-30

## 2018-05-09 ENCOUNTER — MEDICATION RENEWAL (OUTPATIENT)
Age: 28
End: 2018-05-09

## 2018-05-11 ENCOUNTER — MEDICATION RENEWAL (OUTPATIENT)
Age: 28
End: 2018-05-11

## 2018-05-22 ENCOUNTER — NON-APPOINTMENT (OUTPATIENT)
Age: 28
End: 2018-05-22

## 2018-05-22 ENCOUNTER — MED ADMIN CHARGE (OUTPATIENT)
Age: 28
End: 2018-05-22

## 2018-05-22 ENCOUNTER — APPOINTMENT (OUTPATIENT)
Dept: INTERNAL MEDICINE | Facility: CLINIC | Age: 28
End: 2018-05-22
Payer: COMMERCIAL

## 2018-05-22 VITALS
HEART RATE: 80 BPM | BODY MASS INDEX: 24.45 KG/M2 | RESPIRATION RATE: 16 BRPM | TEMPERATURE: 98.1 F | HEIGHT: 63 IN | OXYGEN SATURATION: 97 % | SYSTOLIC BLOOD PRESSURE: 100 MMHG | WEIGHT: 138 LBS | DIASTOLIC BLOOD PRESSURE: 56 MMHG

## 2018-05-22 PROCEDURE — 99214 OFFICE O/P EST MOD 30 MIN: CPT | Mod: 25

## 2018-05-22 PROCEDURE — 94060 EVALUATION OF WHEEZING: CPT

## 2018-05-22 PROCEDURE — 96372 THER/PROPH/DIAG INJ SC/IM: CPT | Mod: 59

## 2018-05-22 RX ORDER — TRIAMCINOLONE ACETONIDE 40 MG/ML
40 SUSPENSION INTRA-ARTERIAL; INTRAMUSCULAR
Qty: 1 | Refills: 0 | Status: COMPLETED | OUTPATIENT
Start: 2018-05-22

## 2018-05-22 RX ADMIN — TRIAMCINOLONE ACETONIDE 0 MG/ML: 40 INJECTION, SUSPENSION INTRA-ARTICULAR; INTRAMUSCULAR at 00:00

## 2018-05-22 NOTE — PHYSICAL EXAM
[No Acute Distress] : no acute distress [Well Nourished] : well nourished [Well Developed] : well developed [Well-Appearing] : well-appearing [Normal Outer Ear/Nose] : the outer ears and nose were normal in appearance [Normal Oropharynx] : the oropharynx was normal [Normal TMs] : both tympanic membranes were normal [Normal Nasal Mucosa] : the nasal mucosa was normal [No Respiratory Distress] : no respiratory distress  [No Accessory Muscle Use] : no accessory muscle use [Normal Rate] : normal rate  [Regular Rhythm] : with a regular rhythm [Normal S1, S2] : normal S1 and S2 [Pedal Pulses Present] : the pedal pulses are present [Normal Posterior Cervical Nodes] : no posterior cervical lymphadenopathy [Normal Anterior Cervical Nodes] : no anterior cervical lymphadenopathy [Coordination Grossly Intact] : coordination grossly intact [No Focal Deficits] : no focal deficits [Normal Affect] : the affect was normal [Normal Insight/Judgement] : insight and judgment were intact [de-identified] : expiratory wheeze noted on forced maneuvers

## 2018-05-22 NOTE — DATA REVIEWED
[FreeTextEntry1] : Pre/post spirometry performed.  FEV 1 is 3.32 and  106 % with an FEV1 /FVC 88 %.  Normal flow rates. Post bronchodilator test not improved.

## 2018-05-22 NOTE — HISTORY OF PRESENT ILLNESS
[FreeTextEntry8] : Pt presents with complaints of increased wheezing and cough for 1 week. She  had Prednisone  20 mg po tabs at home and took 40 mg po the  last 2 days  with little effect. 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 Cough is keeping her up at night , she is using her inhalers with compliance daily. Has had to use her rescue neb more often over last 2 days. Denies fever, chills, weight loss, hemoptysis.

## 2018-05-22 NOTE — ASSESSMENT
[FreeTextEntry1] : asthmatic bronchitis\par Prednisone 20 mg po BId x 6 days \par Kenalog 60 mg im now\par Doxycycline 100 mg po bid x 7 days \par Robitussin DM PRN \par continue nebulizer therapy daily, \par Call if not improving or symptoms worsen. \par \par

## 2018-05-22 NOTE — REVIEW OF SYSTEMS
[Fever] : no fever [Chills] : no chills [Fatigue] : fatigue [Earache] : no earache [Hearing Loss] : no hearing loss [Nasal Discharge] : no nasal discharge [Sore Throat] : no sore throat [Chest Pain] : no chest pain [Palpitations] : no palpitations [Shortness Of Breath] : shortness of breath [Wheezing] : wheezing [Cough] : cough [Dyspnea on Exertion] : dyspnea on exertion [Negative] : Psychiatric [FreeTextEntry6] : see HPI

## 2018-05-30 ENCOUNTER — EMERGENCY (EMERGENCY)
Facility: HOSPITAL | Age: 28
LOS: 0 days | Discharge: HOME | End: 2018-05-30
Attending: EMERGENCY MEDICINE | Admitting: EMERGENCY MEDICINE
Payer: COMMERCIAL

## 2018-05-30 VITALS
HEART RATE: 108 BPM | SYSTOLIC BLOOD PRESSURE: 120 MMHG | DIASTOLIC BLOOD PRESSURE: 82 MMHG | OXYGEN SATURATION: 100 % | RESPIRATION RATE: 20 BRPM

## 2018-05-30 VITALS — HEART RATE: 139 BPM | OXYGEN SATURATION: 99 % | RESPIRATION RATE: 29 BRPM | HEIGHT: 63 IN | WEIGHT: 138.01 LBS

## 2018-05-30 DIAGNOSIS — R06.00 DYSPNEA, UNSPECIFIED: ICD-10-CM

## 2018-05-30 DIAGNOSIS — Z98.890 OTHER SPECIFIED POSTPROCEDURAL STATES: Chronic | ICD-10-CM

## 2018-05-30 DIAGNOSIS — Q79.6 EHLERS-DANLOS SYNDROMES: ICD-10-CM

## 2018-05-30 DIAGNOSIS — Z86.011 PERSONAL HISTORY OF BENIGN NEOPLASM OF THE BRAIN: Chronic | ICD-10-CM

## 2018-05-30 DIAGNOSIS — J45.909 UNSPECIFIED ASTHMA, UNCOMPLICATED: ICD-10-CM

## 2018-05-30 DIAGNOSIS — K21.9 GASTRO-ESOPHAGEAL REFLUX DISEASE WITHOUT ESOPHAGITIS: ICD-10-CM

## 2018-05-30 LAB
ALBUMIN SERPL ELPH-MCNC: 4.2 G/DL — SIGNIFICANT CHANGE UP (ref 3.3–5)
ALP SERPL-CCNC: 51 U/L — SIGNIFICANT CHANGE UP (ref 40–120)
ALT FLD-CCNC: 27 U/L — SIGNIFICANT CHANGE UP (ref 12–78)
ANION GAP SERPL CALC-SCNC: 13 MMOL/L — SIGNIFICANT CHANGE UP (ref 5–17)
AST SERPL-CCNC: 22 U/L — SIGNIFICANT CHANGE UP (ref 15–37)
BASOPHILS # BLD AUTO: 0.03 K/UL — SIGNIFICANT CHANGE UP (ref 0–0.2)
BASOPHILS NFR BLD AUTO: 0.2 % — SIGNIFICANT CHANGE UP (ref 0–2)
BILIRUB SERPL-MCNC: 0.5 MG/DL — SIGNIFICANT CHANGE UP (ref 0.2–1.2)
BUN SERPL-MCNC: 17 MG/DL — SIGNIFICANT CHANGE UP (ref 7–23)
CALCIUM SERPL-MCNC: 9.2 MG/DL — SIGNIFICANT CHANGE UP (ref 8.5–10.1)
CHLORIDE SERPL-SCNC: 99 MMOL/L — SIGNIFICANT CHANGE UP (ref 96–108)
CO2 SERPL-SCNC: 25 MMOL/L — SIGNIFICANT CHANGE UP (ref 22–31)
CREAT SERPL-MCNC: 1.03 MG/DL — SIGNIFICANT CHANGE UP (ref 0.5–1.3)
D DIMER BLD IA.RAPID-MCNC: <150 NG/ML DDU — SIGNIFICANT CHANGE UP
EOSINOPHIL # BLD AUTO: 0.16 K/UL — SIGNIFICANT CHANGE UP (ref 0–0.5)
EOSINOPHIL NFR BLD AUTO: 1.1 % — SIGNIFICANT CHANGE UP (ref 0–6)
GLUCOSE SERPL-MCNC: 80 MG/DL — SIGNIFICANT CHANGE UP (ref 70–99)
HCG SERPL-ACNC: <1 MIU/ML — SIGNIFICANT CHANGE UP
HCT VFR BLD CALC: 44.3 % — SIGNIFICANT CHANGE UP (ref 34.5–45)
HGB BLD-MCNC: 15.5 G/DL — SIGNIFICANT CHANGE UP (ref 11.5–15.5)
IMM GRANULOCYTES NFR BLD AUTO: 0.8 % — SIGNIFICANT CHANGE UP (ref 0–1.5)
LYMPHOCYTES # BLD AUTO: 31.9 % — SIGNIFICANT CHANGE UP (ref 13–44)
LYMPHOCYTES # BLD AUTO: 4.67 K/UL — HIGH (ref 1–3.3)
MCHC RBC-ENTMCNC: 31 PG — SIGNIFICANT CHANGE UP (ref 27–34)
MCHC RBC-ENTMCNC: 35 GM/DL — SIGNIFICANT CHANGE UP (ref 32–36)
MCV RBC AUTO: 88.6 FL — SIGNIFICANT CHANGE UP (ref 80–100)
MONOCYTES # BLD AUTO: 0.62 K/UL — SIGNIFICANT CHANGE UP (ref 0–0.9)
MONOCYTES NFR BLD AUTO: 4.2 % — SIGNIFICANT CHANGE UP (ref 2–14)
NEUTROPHILS # BLD AUTO: 9.04 K/UL — HIGH (ref 1.8–7.4)
NEUTROPHILS NFR BLD AUTO: 61.8 % — SIGNIFICANT CHANGE UP (ref 43–77)
NRBC # BLD: 0 /100 WBCS — SIGNIFICANT CHANGE UP (ref 0–0)
PLATELET # BLD AUTO: 457 K/UL — HIGH (ref 150–400)
POTASSIUM SERPL-MCNC: 3.2 MMOL/L — LOW (ref 3.5–5.3)
POTASSIUM SERPL-SCNC: 3.2 MMOL/L — LOW (ref 3.5–5.3)
PROT SERPL-MCNC: 7.9 GM/DL — SIGNIFICANT CHANGE UP (ref 6–8.3)
RBC # BLD: 5 M/UL — SIGNIFICANT CHANGE UP (ref 3.8–5.2)
RBC # FLD: 12.2 % — SIGNIFICANT CHANGE UP (ref 10.3–14.5)
SODIUM SERPL-SCNC: 137 MMOL/L — SIGNIFICANT CHANGE UP (ref 135–145)
WBC # BLD: 14.63 K/UL — HIGH (ref 3.8–10.5)
WBC # FLD AUTO: 14.63 K/UL — HIGH (ref 3.8–10.5)

## 2018-05-30 PROCEDURE — 71046 X-RAY EXAM CHEST 2 VIEWS: CPT | Mod: 26

## 2018-05-30 PROCEDURE — 99291 CRITICAL CARE FIRST HOUR: CPT

## 2018-05-30 RX ORDER — IPRATROPIUM/ALBUTEROL SULFATE 18-103MCG
3 AEROSOL WITH ADAPTER (GRAM) INHALATION
Qty: 0 | Refills: 0 | Status: COMPLETED | OUTPATIENT
Start: 2018-05-30 | End: 2018-05-30

## 2018-05-30 RX ORDER — MAGNESIUM SULFATE 500 MG/ML
2 VIAL (ML) INJECTION ONCE
Qty: 0 | Refills: 0 | Status: COMPLETED | OUTPATIENT
Start: 2018-05-30 | End: 2018-05-30

## 2018-05-30 RX ADMIN — Medication 50 GRAM(S): at 15:32

## 2018-05-30 RX ADMIN — Medication 3 MILLILITER(S): at 15:21

## 2018-05-30 RX ADMIN — Medication 3 MILLILITER(S): at 15:10

## 2018-05-30 RX ADMIN — Medication 125 MILLIGRAM(S): at 15:24

## 2018-05-30 RX ADMIN — Medication 3 MILLILITER(S): at 15:46

## 2018-05-30 NOTE — ED PROVIDER NOTE - CRITICAL CARE PROVIDED
interpretation of diagnostic studies/additional history taking/direct patient care (not related to procedure)/documentation/consult w/ pt's family directly relating to pts condition

## 2018-05-30 NOTE — ED PROVIDER NOTE - OBJECTIVE STATEMENT
28 y/o F with PMHx of asthma on Albuterol presents to the ED c/o SOB and coughing. Denies any fever or chills. Pt was recently dx with bronchitis and was on 20 mg Prednisone BID for 7 days (finished yesterday). Pt' sx were improving but today that she is off the Prednisone she began to feel worse. Pt's last visit at hospital was 02/2018 for asthma exacerbation. 28 y/o F with PMHx of asthma on Albuterol presents to the ED c/o SOB and coughing. Denies any fever or chills. Pt was recently dx with bronchitis and was on 20 mg Prednisone BID for 7 days (finished yesterday). Pt' sx were improving but today that she is off the Prednisone she began to feel worse. Pt's last visit at hospital was 02/2018 for asthma exacerbation. + cough, no fevers, chills, weats, chest pain, trauma. 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 No LE pain or swelling

## 2018-05-30 NOTE — ED PROVIDER NOTE - MEDICAL DECISION MAKING DETAILS
Pt presenting with persistent cough and sob consistent with previous asthma exacerbations. Will give nebulizer, steroids, magnesium. Conformable appearing with minimal wheezing. Will likely discharge. Pt presenting with persistent cough and sob consistent with previous asthma exacerbations. Will give nebulizer, steroids, magnesium. Conformable appearing with minimal wheezing. d-dimer for sob on BC without large wheezing on exam. Will likely discharge.

## 2018-05-30 NOTE — ED ADULT NURSE NOTE - OBJECTIVE STATEMENT
c/o SOB not relieved by 2 neb treatments or inhaler PTA, pt recently tx with steroids for URI by Dr. Christianson -pulmonologist

## 2018-05-31 ENCOUNTER — APPOINTMENT (OUTPATIENT)
Dept: INTERNAL MEDICINE | Facility: CLINIC | Age: 28
End: 2018-05-31
Payer: COMMERCIAL

## 2018-05-31 VITALS
HEIGHT: 63 IN | WEIGHT: 136 LBS | OXYGEN SATURATION: 97 % | BODY MASS INDEX: 24.1 KG/M2 | TEMPERATURE: 98.3 F | HEART RATE: 93 BPM | SYSTOLIC BLOOD PRESSURE: 110 MMHG | DIASTOLIC BLOOD PRESSURE: 70 MMHG | RESPIRATION RATE: 16 BRPM

## 2018-05-31 DIAGNOSIS — E87.6 HYPOKALEMIA: ICD-10-CM

## 2018-05-31 PROCEDURE — 99496 TRANSJ CARE MGMT HIGH F2F 7D: CPT

## 2018-05-31 PROCEDURE — 94727 GAS DIL/WSHOT DETER LNG VOL: CPT

## 2018-05-31 PROCEDURE — ZZZZZ: CPT

## 2018-05-31 PROCEDURE — 94729 DIFFUSING CAPACITY: CPT

## 2018-05-31 PROCEDURE — 94060 EVALUATION OF WHEEZING: CPT

## 2018-05-31 RX ORDER — PREDNISONE 20 MG/1
20 TABLET ORAL
Qty: 12 | Refills: 0 | Status: DISCONTINUED | COMMUNITY
Start: 2018-05-22 | End: 2018-05-31

## 2018-05-31 NOTE — PHYSICAL EXAM
[No Acute Distress] : no acute distress [Well Nourished] : well nourished [Well Developed] : well developed [Normal Sclera/Conjunctiva] : normal sclera/conjunctiva [Normal Oropharynx] : the oropharynx was normal [Supple] : supple [No Respiratory Distress] : no respiratory distress  [Clear to Auscultation] : lungs were clear to auscultation bilaterally [No Accessory Muscle Use] : no accessory muscle use [Normal Rate] : normal rate  [Regular Rhythm] : with a regular rhythm [No Edema] : there was no peripheral edema [Grossly Normal Strength/Tone] : grossly normal strength/tone [No Rash] : no rash [No Focal Deficits] : no focal deficits [Normal Affect] : the affect was normal [Normal Insight/Judgement] : insight and judgment were intact

## 2018-05-31 NOTE — ASSESSMENT
[FreeTextEntry1] : S/P ED visit yesterday for acute flare of Asthma.\par Stable today.\par PFT with normal flows, no wheeze or SOB.\par There has been consideration of adding Xolair, ImmunoCAP  total IgE was normal range 10/2016.\par Extensive allergy and rheum studies were done that year.\par Incidental hypokalemia.

## 2018-05-31 NOTE — REVIEW OF SYSTEMS
[Fever] : no fever [Chills] : no chills [Fatigue] : fatigue [Negative] : Heme/Lymph [FreeTextEntry2] : Attributes sleeplessness last night to the medications given in ED. [FreeTextEntry6] : Except mild nonproductive, residual cough.

## 2018-05-31 NOTE — HISTORY OF PRESENT ILLNESS
[FreeTextEntry8] : Patient is s/p acute asthma flare.  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 Took a nebulizer treatment at work which did not afford much relief.  Went to ED, was given Solumedrol, additional nebulizer treatments, chest x-ray (negative results) and blood work including D-Dimer assay (negative.)  Incidentally potassium level was low.  Was given Medrol dose pack to begin this morning, she did not start the medication.\par Currently denies SOB or wheeze.  She has residual nonproductive cough from Bronchitis that was treated in our office.  She reports being comfortable.  She had completed a 6 day course of Prednisone and Doxycycline earlier this week.\par Compliant with her complex Pulmonary medication regimen.  The only drug she is not using on a daily basis is an antihistamine.\par

## 2018-05-31 NOTE — PLAN
[FreeTextEntry1] : Patient will continue All current medications as prescribed and will add Allegra every morning.\par Reviewed recent and past labs with patient.\par Will review with Dr. Jolly.\par Repeat BMP to monitor potassium.\par Patient advised to schedule appointment August.\par Will call sooner if questions or problems.\par To ED for emergent symptoms.\par

## 2018-06-28 ENCOUNTER — MEDICATION RENEWAL (OUTPATIENT)
Age: 28
End: 2018-06-28

## 2018-07-05 ENCOUNTER — APPOINTMENT (OUTPATIENT)
Dept: INTERNAL MEDICINE | Facility: CLINIC | Age: 28
End: 2018-07-05
Payer: COMMERCIAL

## 2018-07-05 ENCOUNTER — OTHER (OUTPATIENT)
Age: 28
End: 2018-07-05

## 2018-07-05 ENCOUNTER — NON-APPOINTMENT (OUTPATIENT)
Age: 28
End: 2018-07-05

## 2018-07-05 VITALS
WEIGHT: 136 LBS | RESPIRATION RATE: 16 BRPM | HEART RATE: 101 BPM | DIASTOLIC BLOOD PRESSURE: 70 MMHG | SYSTOLIC BLOOD PRESSURE: 130 MMHG | HEIGHT: 63 IN | OXYGEN SATURATION: 99 % | BODY MASS INDEX: 24.1 KG/M2 | TEMPERATURE: 99.3 F

## 2018-07-05 PROCEDURE — 94060 EVALUATION OF WHEEZING: CPT

## 2018-07-05 PROCEDURE — 99213 OFFICE O/P EST LOW 20 MIN: CPT | Mod: 25

## 2018-07-05 NOTE — HISTORY OF PRESENT ILLNESS
[FreeTextEntry8] : Patient presents in follow up for moderate persistent asthma.  Was last seen in our office 5 weeks ago post acute asthma flare for which she presented to ED.\par Since that time she has been stable.  Has rarely needed albuterol as rescue.  The incidences were when the humidity was high.\par Compliant with Pulmonary medications.\par Tries to remain indoors in air conditioning, away from heat and humidity.\par Denies cough, SOB, wheeze or dyspnea on exertion.\par Has been exercising indoors without difficulty.\steven Recently had engagement party and is planning on marrying in 2020.

## 2018-07-05 NOTE — PLAN
[FreeTextEntry1] : Continue current medications as prescribed.\par OV follow up 6 months.\par Prior to this visit will draw ImmunoCAP IgE and quantitative IgA.

## 2018-07-05 NOTE — DATA REVIEWED
[FreeTextEntry1] : Spirometry reveals normal flows without significant reactivity post bronchodilator.

## 2018-07-05 NOTE — PHYSICAL EXAM
[No Acute Distress] : no acute distress [Well Nourished] : well nourished [Normal Sclera/Conjunctiva] : normal sclera/conjunctiva [PERRL] : pupils equal round and reactive to light [Normal Outer Ear/Nose] : the outer ears and nose were normal in appearance [Normal Oropharynx] : the oropharynx was normal [No JVD] : no jugular venous distention [Supple] : supple [No Respiratory Distress] : no respiratory distress  [Clear to Auscultation] : lungs were clear to auscultation bilaterally [Normal Rate] : normal rate  [Regular Rhythm] : with a regular rhythm [Normal S1, S2] : normal S1 and S2 [No Edema] : there was no peripheral edema [Normal Posterior Cervical Nodes] : no posterior cervical lymphadenopathy [Normal Anterior Cervical Nodes] : no anterior cervical lymphadenopathy [Grossly Normal Strength/Tone] : grossly normal strength/tone [No Rash] : no rash [No Focal Deficits] : no focal deficits [Normal Affect] : the affect was normal [Normal Insight/Judgement] : insight and judgment were intact

## 2018-07-30 ENCOUNTER — APPOINTMENT (OUTPATIENT)
Dept: INTERNAL MEDICINE | Facility: CLINIC | Age: 28
End: 2018-07-30

## 2018-08-02 ENCOUNTER — APPOINTMENT (OUTPATIENT)
Dept: INTERNAL MEDICINE | Facility: CLINIC | Age: 28
End: 2018-08-02

## 2018-09-07 NOTE — ED PROVIDER NOTE - CPE EDP CARDIAC NORM
Left message to call back   
Needs a MWV.   OK to refill until she can be seen  Thanks  
Patient requesting refill of Amlodipine last seen and labs 9- and no future appt scheduled.  Please advise, thank you.  
normal...

## 2018-09-13 NOTE — ED ADULT TRIAGE NOTE - NSWEIGHTCALCTOOLDRUG_GEN_A_CORE
MRI was just for pre-SCS placement. 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 Looks like he is scheduled for 9/17/18 for the surgery. Please tell Sammy Kolb we will be thinking of him and best wishes. We can follow up with him after the SCS placement. Thank you!
 used

## 2018-09-24 ENCOUNTER — RX RENEWAL (OUTPATIENT)
Age: 28
End: 2018-09-24

## 2018-10-16 PROBLEM — N32.89 OTHER SPECIFIED DISORDERS OF BLADDER: Chronic | Status: ACTIVE | Noted: 2017-06-01

## 2018-10-16 PROBLEM — J45.909 UNSPECIFIED ASTHMA, UNCOMPLICATED: Chronic | Status: ACTIVE | Noted: 2017-03-19

## 2018-10-16 PROBLEM — K21.9 GASTRO-ESOPHAGEAL REFLUX DISEASE WITHOUT ESOPHAGITIS: Chronic | Status: ACTIVE | Noted: 2017-03-19

## 2018-10-16 PROBLEM — D80.2 SELECTIVE DEFICIENCY OF IMMUNOGLOBULIN A [IGA]: Chronic | Status: ACTIVE | Noted: 2017-06-01

## 2018-10-16 PROBLEM — Q79.6 EHLERS-DANLOS SYNDROMES: Chronic | Status: ACTIVE | Noted: 2017-06-01

## 2018-10-16 PROBLEM — E03.9 HYPOTHYROIDISM, UNSPECIFIED: Chronic | Status: ACTIVE | Noted: 2017-03-19

## 2018-10-17 ENCOUNTER — RESULT CHARGE (OUTPATIENT)
Age: 28
End: 2018-10-17

## 2018-10-18 ENCOUNTER — APPOINTMENT (OUTPATIENT)
Dept: INTERNAL MEDICINE | Facility: CLINIC | Age: 28
End: 2018-10-18
Payer: COMMERCIAL

## 2018-10-18 ENCOUNTER — NON-APPOINTMENT (OUTPATIENT)
Age: 28
End: 2018-10-18

## 2018-10-18 ENCOUNTER — RESULT REVIEW (OUTPATIENT)
Age: 28
End: 2018-10-18

## 2018-10-18 VITALS
OXYGEN SATURATION: 97 % | RESPIRATION RATE: 16 BRPM | HEART RATE: 112 BPM | BODY MASS INDEX: 24.45 KG/M2 | WEIGHT: 138 LBS | HEIGHT: 63 IN | DIASTOLIC BLOOD PRESSURE: 58 MMHG | TEMPERATURE: 98.6 F | SYSTOLIC BLOOD PRESSURE: 112 MMHG

## 2018-10-18 PROCEDURE — 94060 EVALUATION OF WHEEZING: CPT

## 2018-10-18 PROCEDURE — 99213 OFFICE O/P EST LOW 20 MIN: CPT | Mod: 25

## 2018-10-18 RX ORDER — CIPROFLOXACIN HYDROCHLORIDE 500 MG/1
500 TABLET, FILM COATED ORAL
Refills: 0 | Status: COMPLETED | COMMUNITY

## 2018-10-18 RX ORDER — PREDNISONE 20 MG/1
20 TABLET ORAL
Refills: 0 | Status: DISCONTINUED | COMMUNITY
End: 2018-10-18

## 2018-10-18 NOTE — REVIEW OF SYSTEMS
[Chills] : chills [Fatigue] : fatigue [Nasal Discharge] : nasal discharge [Sore Throat] : sore throat [Wheezing] : wheezing [Cough] : cough [Negative] : Heme/Lymph [Fever] : no fever [Discharge] : no discharge [Redness] : no redness [Vision Problems] : no vision problems [Shortness Of Breath] : no shortness of breath [Skin Rash] : no skin rash

## 2018-10-18 NOTE — HISTORY OF PRESENT ILLNESS
[FreeTextEntry8] : Patient presents for evaluation of sore throat, cough and wheeze.  \par Accompanying mild frontal sinus pressure on left.  \par Cough is scantly productive of whitish sputum.  \par She has been using Azelastine nasal spray with modest relief.  \par Denies fevers, compliant with pulmonary medications.\par Recently treated by PCP for URI, completed Prednisone taper (30 mg for 1 day then taper down by 5 mg each day) and Cipro 500 mg BID.\par "Nearly" fully recovered, then above stated symptoms progressed.

## 2018-10-18 NOTE — PLAN
[FreeTextEntry1] : Azithromycin 250 mg tabs, 2 tabs x 1 day then 1 tab daily for 4 days.\par Prednisone 20 mg tabs, 1 tab BID for 3 days then 1 tab daily for 3 days PC.\par Continue current medications as prescribed.\par Restart fluticasone 2 squirts each nostril daily.\par Hydrate.\par Call office if escalation of symptoms/no resolve.

## 2018-10-18 NOTE — PHYSICAL EXAM
[No Acute Distress] : no acute distress [Well Nourished] : well nourished [Normal Sclera/Conjunctiva] : normal sclera/conjunctiva [Normal Outer Ear/Nose] : the outer ears and nose were normal in appearance [Supple] : supple [No Respiratory Distress] : no respiratory distress  [Regular Rhythm] : with a regular rhythm [Normal S1, S2] : normal S1 and S2 [No Murmur] : no murmur heard [No Edema] : there was no peripheral edema [Normal Supraclavicular Nodes] : no supraclavicular lymphadenopathy [Normal Posterior Cervical Nodes] : no posterior cervical lymphadenopathy [Normal Anterior Cervical Nodes] : no anterior cervical lymphadenopathy [Grossly Normal Strength/Tone] : grossly normal strength/tone [No Focal Deficits] : no focal deficits [Normal Affect] : the affect was normal [Normal Insight/Judgement] : insight and judgment were intact [de-identified] : oropharynx mildly erythematous without exudates.  Mild serous bulge left TM. [de-identified] : End expiratory wheeze left posterior lung base.

## 2018-12-20 ENCOUNTER — RX RENEWAL (OUTPATIENT)
Age: 28
End: 2018-12-20

## 2018-12-21 ENCOUNTER — MEDICATION RENEWAL (OUTPATIENT)
Age: 28
End: 2018-12-21

## 2018-12-27 ENCOUNTER — MEDICATION RENEWAL (OUTPATIENT)
Age: 28
End: 2018-12-27

## 2019-01-14 ENCOUNTER — APPOINTMENT (OUTPATIENT)
Dept: INTERNAL MEDICINE | Facility: CLINIC | Age: 29
End: 2019-01-14
Payer: COMMERCIAL

## 2019-01-14 ENCOUNTER — NON-APPOINTMENT (OUTPATIENT)
Age: 29
End: 2019-01-14

## 2019-01-14 VITALS
OXYGEN SATURATION: 99 % | HEART RATE: 110 BPM | TEMPERATURE: 98.4 F | BODY MASS INDEX: 25.16 KG/M2 | RESPIRATION RATE: 18 BRPM | HEIGHT: 63 IN | SYSTOLIC BLOOD PRESSURE: 114 MMHG | WEIGHT: 142 LBS | DIASTOLIC BLOOD PRESSURE: 58 MMHG

## 2019-01-14 DIAGNOSIS — K22.4 DYSKINESIA OF ESOPHAGUS: ICD-10-CM

## 2019-01-14 PROCEDURE — 94060 EVALUATION OF WHEEZING: CPT

## 2019-01-14 PROCEDURE — 99214 OFFICE O/P EST MOD 30 MIN: CPT | Mod: 25

## 2019-01-14 RX ORDER — PREDNISONE 20 MG/1
20 TABLET ORAL
Qty: 9 | Refills: 0 | Status: DISCONTINUED | COMMUNITY
Start: 2018-10-18 | End: 2019-01-14

## 2019-01-14 RX ORDER — AZITHROMYCIN 250 MG/1
250 TABLET, FILM COATED ORAL
Qty: 1 | Refills: 0 | Status: DISCONTINUED | COMMUNITY
Start: 2018-10-18 | End: 2019-01-14

## 2019-01-14 RX ORDER — FLUTICASONE PROPIONATE 50 UG/1
50 SPRAY, METERED NASAL
Refills: 0 | Status: DISCONTINUED | COMMUNITY
End: 2019-01-14

## 2019-01-14 NOTE — PLAN
[FreeTextEntry1] : 1. The patient will complete a prednisone which she will take 40 mg for another 2 days before discontinuing.\par \par 2. We'll now add Spiriva Respimat 1.25 mcg 2 puffs daily to the regimen as part of her maintenance. I am concerned that she has been on several courses of prednisone in 3 out of the last 4 months. Of note is the fact that she underwent a fairly extensive workup including bronchoscopy and the dynamic CAT scan of the chest in [place] in 2016, to rule out tracheobronchomalacia.\par \par 3. Followup in 6 months with full pulmonary function testing.

## 2019-01-14 NOTE — HISTORY OF PRESENT ILLNESS
[de-identified] : The patient is in today for followup evaluation. She states that she was doing fairly well, until several days ago, when she noted the onset of increasing cough. She felt achy. She also had postnasal drip. The cough has been primarily dry and nonproductive. She began using albuterol due to the chest tightness. She then noted the onset of a slight wheeze. She denies any fevers or riders. She started herself on prednisone which he took 40 mg a day for the past 4 days. She did this on her own. She now comes in for this assessment.\par \par The patient states that she is feeling better. The cough, however, still persists. She has been compliant with other medications. Of note is the fact that she did take prednisone in both October of 2018 and November of 2018. She has been compliant with the nasal sprays. She now comes in for this assessment.

## 2019-01-14 NOTE — PHYSICAL EXAM
[General Appearance - Alert] : alert [General Appearance - In No Acute Distress] : in no acute distress [General Appearance - Well Nourished] : well nourished [General Appearance - Well Developed] : well developed [Outer Ear] : the ears and nose were normal in appearance [Both Tympanic Membranes Were Examined] : both tympanic membranes were normal [Nasal Cavity] : the nasal mucosa and septum were normal [Oropharynx] : the oropharynx was normal [Neck Appearance] : the appearance of the neck was normal [] : no respiratory distress [Respiration, Rhythm And Depth] : normal respiratory rhythm and effort [Exaggerated Use Of Accessory Muscles For Inspiration] : no accessory muscle use [Auscultation Breath Sounds / Voice Sounds] : lungs were clear to auscultation bilaterally [Apical Impulse] : the apical impulse was normal [Heart Rate And Rhythm] : heart rate was normal and rhythm regular [Heart Sounds] : normal S1 and S2 [Murmurs] : no murmurs [Full Pulse] : the pedal pulses are present [Edema] : there was no peripheral edema [Bowel Sounds] : normal bowel sounds [Abdomen Soft] : soft [Abdomen Tenderness] : non-tender [Cervical Lymph Nodes Enlarged Posterior Bilaterally] : posterior cervical [Cervical Lymph Nodes Enlarged Anterior Bilaterally] : anterior cervical [Nail Clubbing] : no clubbing  or cyanosis of the fingernails

## 2019-02-07 ENCOUNTER — APPOINTMENT (OUTPATIENT)
Dept: INTERNAL MEDICINE | Facility: CLINIC | Age: 29
End: 2019-02-07

## 2019-02-07 ENCOUNTER — APPOINTMENT (OUTPATIENT)
Dept: INTERNAL MEDICINE | Facility: CLINIC | Age: 29
End: 2019-02-07
Payer: COMMERCIAL

## 2019-02-07 ENCOUNTER — NON-APPOINTMENT (OUTPATIENT)
Age: 29
End: 2019-02-07

## 2019-02-07 VITALS
WEIGHT: 143 LBS | TEMPERATURE: 98.7 F | HEART RATE: 108 BPM | HEIGHT: 63 IN | SYSTOLIC BLOOD PRESSURE: 96 MMHG | BODY MASS INDEX: 25.34 KG/M2 | DIASTOLIC BLOOD PRESSURE: 68 MMHG | OXYGEN SATURATION: 98 % | RESPIRATION RATE: 20 BRPM

## 2019-02-07 PROCEDURE — 96372 THER/PROPH/DIAG INJ SC/IM: CPT | Mod: 59

## 2019-02-07 PROCEDURE — 94060 EVALUATION OF WHEEZING: CPT

## 2019-02-07 PROCEDURE — 99213 OFFICE O/P EST LOW 20 MIN: CPT | Mod: 25

## 2019-02-07 RX ORDER — GUAIFENESIN/DEXTROMETHORPHAN 100-10MG/5
SYRUP ORAL
Refills: 0 | Status: DISCONTINUED | COMMUNITY
End: 2019-02-07

## 2019-02-07 RX ORDER — TRIAMCINOLONE ACETONIDE 40 MG/ML
40 SUSPENSION INTRA-ARTERIAL; INTRAMUSCULAR
Qty: 6 | Refills: 0 | Status: COMPLETED | OUTPATIENT
Start: 2019-02-07

## 2019-02-07 RX ORDER — LORATADINE 5 MG/5 ML
10 SOLUTION, ORAL ORAL
Refills: 0 | Status: DISCONTINUED | COMMUNITY
End: 2019-02-07

## 2019-02-07 RX ADMIN — TRIAMCINOLONE ACETONIDE 1.5 MG/ML: 40 INJECTION, SUSPENSION INTRA-ARTICULAR; INTRAMUSCULAR at 00:00

## 2019-02-07 NOTE — HISTORY OF PRESENT ILLNESS
[FreeTextEntry8] : Patient presents for evaluation of frequent, deep cough productive of green mucus.  Started 6 days ago.  Began as sore throat.  \par She is in constant contact with children.\par Started using Tylenol extra strength for sore throat and aches, pseudoephedrine, fluticasone, azelastine, nebulizer treatments and guaifenesin with codeine at night.\par Symptoms still progressed.\par Compliant with all other medications.\par Reports that since starting Spiriva 1/16/19, she was able, until this episode, to decrease Combivent use.\par

## 2019-02-07 NOTE — REVIEW OF SYSTEMS
[Fever] : no fever [Chills] : no chills [Fatigue] : fatigue [Nasal Discharge] : nasal discharge [Sore Throat] : sore throat [Wheezing] : wheezing [Cough] : cough [Dyspnea on Exertion] : no dyspnea on exertion [Headache] : headache [Negative] : Heme/Lymph

## 2019-02-07 NOTE — PHYSICAL EXAM
[No Acute Distress] : no acute distress [Well Nourished] : well nourished [Normal Sclera/Conjunctiva] : normal sclera/conjunctiva [Normal TMs] : both tympanic membranes were normal [Supple] : supple [No Respiratory Distress] : no respiratory distress  [Normal Rate] : normal rate  [Regular Rhythm] : with a regular rhythm [No Edema] : there was no peripheral edema [Normal Posterior Cervical Nodes] : no posterior cervical lymphadenopathy [Normal Anterior Cervical Nodes] : no anterior cervical lymphadenopathy [Grossly Normal Strength/Tone] : grossly normal strength/tone [No Rash] : no rash [No Focal Deficits] : no focal deficits [Normal Affect] : the affect was normal [Normal Insight/Judgement] : insight and judgment were intact [de-identified] : Oropharynx erythematous without exudates. [de-identified] : end expiratory wheeze left posterior lung.  frequent cough witnessed.

## 2019-02-07 NOTE — PLAN
[FreeTextEntry1] : She will continue current medication regimens along with medication listed in plan.\par Hydrate and rest.\par Discussed briefly adding Vitamin D with calcium in her diet because of frequent, unavoidable steroid use.\par She will call office if no resolve.

## 2019-02-28 NOTE — ED ADULT NURSE NOTE - NS ED NURSE DC INFO COMPLEXITY
Patient presents 12 weeks s/p Right BILLY  Pain controlled on current regimen  No fever/chills  Compliant with physical therapy    Wound c/d/i  No si/sx of infection  NVI distally    Radiographs of the RIGHT hip demonstrate well-positioned prosthesis with no signs of loosening.     12 weeks s/p Right BILLY  PT until discharge  Activity as tolerated  RTC 3 months, will get xrays of RIGHT knee next visit    
Simple: Patient demonstrates quick and easy understanding

## 2019-03-06 ENCOUNTER — TRANSCRIPTION ENCOUNTER (OUTPATIENT)
Age: 29
End: 2019-03-06

## 2019-03-25 ENCOUNTER — MEDICATION RENEWAL (OUTPATIENT)
Age: 29
End: 2019-03-25

## 2019-05-02 ENCOUNTER — EMERGENCY (EMERGENCY)
Facility: HOSPITAL | Age: 29
LOS: 0 days | Discharge: HOME | End: 2019-05-02
Attending: EMERGENCY MEDICINE | Admitting: EMERGENCY MEDICINE
Payer: COMMERCIAL

## 2019-05-02 VITALS
HEART RATE: 114 BPM | SYSTOLIC BLOOD PRESSURE: 126 MMHG | OXYGEN SATURATION: 100 % | RESPIRATION RATE: 19 BRPM | DIASTOLIC BLOOD PRESSURE: 70 MMHG

## 2019-05-02 VITALS — HEIGHT: 66 IN | WEIGHT: 139.99 LBS

## 2019-05-02 DIAGNOSIS — J45.901 UNSPECIFIED ASTHMA WITH (ACUTE) EXACERBATION: ICD-10-CM

## 2019-05-02 DIAGNOSIS — E03.9 HYPOTHYROIDISM, UNSPECIFIED: ICD-10-CM

## 2019-05-02 DIAGNOSIS — Z86.011 PERSONAL HISTORY OF BENIGN NEOPLASM OF THE BRAIN: Chronic | ICD-10-CM

## 2019-05-02 DIAGNOSIS — Z98.890 OTHER SPECIFIED POSTPROCEDURAL STATES: Chronic | ICD-10-CM

## 2019-05-02 DIAGNOSIS — Z88.1 ALLERGY STATUS TO OTHER ANTIBIOTIC AGENTS STATUS: ICD-10-CM

## 2019-05-02 DIAGNOSIS — Q79.6 EHLERS-DANLOS SYNDROMES: ICD-10-CM

## 2019-05-02 DIAGNOSIS — K21.9 GASTRO-ESOPHAGEAL REFLUX DISEASE WITHOUT ESOPHAGITIS: ICD-10-CM

## 2019-05-02 DIAGNOSIS — J45.909 UNSPECIFIED ASTHMA, UNCOMPLICATED: ICD-10-CM

## 2019-05-02 LAB
ALBUMIN SERPL ELPH-MCNC: 4.3 G/DL — SIGNIFICANT CHANGE UP (ref 3.3–5)
ALP SERPL-CCNC: 57 U/L — SIGNIFICANT CHANGE UP (ref 40–120)
ALT FLD-CCNC: 21 U/L — SIGNIFICANT CHANGE UP (ref 12–78)
ANION GAP SERPL CALC-SCNC: 9 MMOL/L — SIGNIFICANT CHANGE UP (ref 5–17)
AST SERPL-CCNC: 18 U/L — SIGNIFICANT CHANGE UP (ref 15–37)
BASOPHILS # BLD AUTO: 0.04 K/UL — SIGNIFICANT CHANGE UP (ref 0–0.2)
BASOPHILS NFR BLD AUTO: 0.5 % — SIGNIFICANT CHANGE UP (ref 0–2)
BILIRUB SERPL-MCNC: 0.5 MG/DL — SIGNIFICANT CHANGE UP (ref 0.2–1.2)
BUN SERPL-MCNC: 9 MG/DL — SIGNIFICANT CHANGE UP (ref 7–23)
CALCIUM SERPL-MCNC: 9.4 MG/DL — SIGNIFICANT CHANGE UP (ref 8.5–10.1)
CHLORIDE SERPL-SCNC: 109 MMOL/L — HIGH (ref 96–108)
CO2 SERPL-SCNC: 21 MMOL/L — LOW (ref 22–31)
CREAT SERPL-MCNC: 1 MG/DL — SIGNIFICANT CHANGE UP (ref 0.5–1.3)
D DIMER BLD IA.RAPID-MCNC: <150 NG/ML DDU — SIGNIFICANT CHANGE UP
EOSINOPHIL # BLD AUTO: 0.11 K/UL — SIGNIFICANT CHANGE UP (ref 0–0.5)
EOSINOPHIL NFR BLD AUTO: 1.3 % — SIGNIFICANT CHANGE UP (ref 0–6)
GLUCOSE SERPL-MCNC: 87 MG/DL — SIGNIFICANT CHANGE UP (ref 70–99)
HCT VFR BLD CALC: 42.1 % — SIGNIFICANT CHANGE UP (ref 34.5–45)
HGB BLD-MCNC: 14.4 G/DL — SIGNIFICANT CHANGE UP (ref 11.5–15.5)
IMM GRANULOCYTES NFR BLD AUTO: 0.2 % — SIGNIFICANT CHANGE UP (ref 0–1.5)
LYMPHOCYTES # BLD AUTO: 2.31 K/UL — SIGNIFICANT CHANGE UP (ref 1–3.3)
LYMPHOCYTES # BLD AUTO: 27 % — SIGNIFICANT CHANGE UP (ref 13–44)
MCHC RBC-ENTMCNC: 31 PG — SIGNIFICANT CHANGE UP (ref 27–34)
MCHC RBC-ENTMCNC: 34.2 GM/DL — SIGNIFICANT CHANGE UP (ref 32–36)
MCV RBC AUTO: 90.5 FL — SIGNIFICANT CHANGE UP (ref 80–100)
MONOCYTES # BLD AUTO: 0.46 K/UL — SIGNIFICANT CHANGE UP (ref 0–0.9)
MONOCYTES NFR BLD AUTO: 5.4 % — SIGNIFICANT CHANGE UP (ref 2–14)
NEUTROPHILS # BLD AUTO: 5.63 K/UL — SIGNIFICANT CHANGE UP (ref 1.8–7.4)
NEUTROPHILS NFR BLD AUTO: 65.6 % — SIGNIFICANT CHANGE UP (ref 43–77)
NRBC # BLD: 0 /100 WBCS — SIGNIFICANT CHANGE UP (ref 0–0)
PLATELET # BLD AUTO: 381 K/UL — SIGNIFICANT CHANGE UP (ref 150–400)
POTASSIUM SERPL-MCNC: 3.4 MMOL/L — LOW (ref 3.5–5.3)
POTASSIUM SERPL-SCNC: 3.4 MMOL/L — LOW (ref 3.5–5.3)
PROT SERPL-MCNC: 7.8 GM/DL — SIGNIFICANT CHANGE UP (ref 6–8.3)
RBC # BLD: 4.65 M/UL — SIGNIFICANT CHANGE UP (ref 3.8–5.2)
RBC # FLD: 12.1 % — SIGNIFICANT CHANGE UP (ref 10.3–14.5)
SODIUM SERPL-SCNC: 139 MMOL/L — SIGNIFICANT CHANGE UP (ref 135–145)
WBC # BLD: 8.57 K/UL — SIGNIFICANT CHANGE UP (ref 3.8–10.5)
WBC # FLD AUTO: 8.57 K/UL — SIGNIFICANT CHANGE UP (ref 3.8–10.5)

## 2019-05-02 PROCEDURE — 71046 X-RAY EXAM CHEST 2 VIEWS: CPT | Mod: 26

## 2019-05-02 PROCEDURE — 93010 ELECTROCARDIOGRAM REPORT: CPT

## 2019-05-02 PROCEDURE — 99284 EMERGENCY DEPT VISIT MOD MDM: CPT

## 2019-05-02 RX ORDER — MAGNESIUM SULFATE 500 MG/ML
2 VIAL (ML) INJECTION ONCE
Qty: 0 | Refills: 0 | Status: COMPLETED | OUTPATIENT
Start: 2019-05-02 | End: 2019-05-02

## 2019-05-02 RX ORDER — IPRATROPIUM/ALBUTEROL SULFATE 18-103MCG
3 AEROSOL WITH ADAPTER (GRAM) INHALATION
Qty: 0 | Refills: 0 | Status: COMPLETED | OUTPATIENT
Start: 2019-05-02 | End: 2019-05-02

## 2019-05-02 RX ADMIN — Medication 2 GRAM(S): at 19:23

## 2019-05-02 RX ADMIN — Medication 3 MILLILITER(S): at 18:39

## 2019-05-02 RX ADMIN — Medication 50 GRAM(S): at 18:17

## 2019-05-02 RX ADMIN — Medication 40 MILLIGRAM(S): at 18:16

## 2019-05-02 RX ADMIN — Medication 3 MILLILITER(S): at 19:13

## 2019-05-02 NOTE — ED PROVIDER NOTE - OBJECTIVE STATEMENT
29 y/o female with PMHx of Asthma on Spiriva, Bladder spasm, Eamon-Danlos syndrome type 3, Hypothyroid, IgA deficiency, GERD presents to the ED c/o SOB, mild cough and chest tightness x2 days, worse with heat. Pt notes sx feel like her previous asthma flare-ups. Pt states the heat at school triggered this current asthma episode. States the heat is one of her triggers. Pt used nebulizer with no relief. On birth control pills. Pt notes she does not usually have wheezing with her asthma sx. Notes that magnesium has helped her sx in the past. Has been admitted for asthma before but was never intubated. A year ago, was in the ED for an exercise induced asthma but was not admitted. States she stopped her steroid medication x3 months ago. Allergic to Bactrim and Keflex. PMD: Dr. Alston. Pulmonologist: Dr. Jolly.

## 2019-05-02 NOTE — ED PROVIDER NOTE - NS_ ATTENDINGSCRIBEDETAILS _ED_A_ED_FT
I, Igor Restrepo MD,  performed the initial face to face bedside interview with this patient regarding history of present illness, review of symptoms and relevant past medical, social and family history.  I completed an independent physical examination.    The history, relevant review of systems, past medical and surgical history, medical decision making, and physical examination was documented by the scribe in my presence and I attest to the accuracy of the documentation.

## 2019-05-02 NOTE — ED ADULT NURSE NOTE - OBJECTIVE STATEMENT
pt p/w c/o asthma exacerbation w/o wheezing, slightly tachypneic upon arrival to ed.  pt states its more upper airway complications.  pt got 3 rounds of nebs, mag, and iv steroids.  pt condition improved with decrease in tachypnea.  pt given discharge instructions and verbalized understanding.

## 2019-05-02 NOTE — ED STATDOCS - PROGRESS NOTE DETAILS
Linda CD for ED attending, Doctor Chato. 27 y/o female with a PMHx of asthma on Spiriva, GERD presents to the ED c/o SOB, mild cough and chest tightness x2 days, worse with heat. Pt notes sx feel like her previous asthma flare-ups. Pt used nebulizer with no relief. On birth control pills. Pt ntoes she does not usually have wheezing with her asthma sx. Notes that magnesium has helped her sx in the past. Denies LE swelling. Allergic to Bactrim and Keflex. PMD- Dr. Jolly Pt noted to have tachycardia, Will send pt to main ED for further evaluation.

## 2019-05-02 NOTE — ED ADULT NURSE NOTE - NSIMPLEMENTINTERV_GEN_ALL_ED
Implemented All Universal Safety Interventions:  [place] to call system. Call bell, personal items and telephone within reach. Instruct patient to call for assistance. Room bathroom lighting operational. Non-slip footwear when patient is off stretcher. Physically safe environment: no spills, clutter or unnecessary equipment. Stretcher in lowest position, wheels locked, appropriate side rails in place.

## 2019-05-06 ENCOUNTER — NON-APPOINTMENT (OUTPATIENT)
Age: 29
End: 2019-05-06

## 2019-05-06 ENCOUNTER — APPOINTMENT (OUTPATIENT)
Dept: INTERNAL MEDICINE | Facility: CLINIC | Age: 29
End: 2019-05-06
Payer: COMMERCIAL

## 2019-05-06 VITALS
OXYGEN SATURATION: 100 % | TEMPERATURE: 98.4 F | BODY MASS INDEX: 25.52 KG/M2 | HEIGHT: 63 IN | SYSTOLIC BLOOD PRESSURE: 110 MMHG | HEART RATE: 118 BPM | RESPIRATION RATE: 18 BRPM | WEIGHT: 144 LBS | DIASTOLIC BLOOD PRESSURE: 82 MMHG

## 2019-05-06 DIAGNOSIS — Z88.9 ALLERGY STATUS TO UNSPECIFIED DRUGS, MEDICAMENTS AND BIOLOGICAL SUBSTANCES: ICD-10-CM

## 2019-05-06 DIAGNOSIS — T50.905A ADVERSE EFFECT OF UNSPECIFIED DRUGS, MEDICAMENTS AND BIOLOGICAL SUBSTANCES, INITIAL ENCOUNTER: ICD-10-CM

## 2019-05-06 PROCEDURE — 94060 EVALUATION OF WHEEZING: CPT

## 2019-05-06 PROCEDURE — 99214 OFFICE O/P EST MOD 30 MIN: CPT | Mod: 25

## 2019-05-06 RX ORDER — NORETHINDRONE ACETATE AND ETHINYL ESTRADIOL 1MG-20(24)
1-20 KIT ORAL
Refills: 0 | Status: COMPLETED | COMMUNITY
End: 2019-05-06

## 2019-05-06 RX ORDER — PREDNISONE 20 MG/1
20 TABLET ORAL
Qty: 14 | Refills: 0 | Status: COMPLETED | COMMUNITY
Start: 2019-01-14 | End: 2019-05-06

## 2019-05-06 RX ORDER — DOXYCYCLINE 100 MG/1
100 CAPSULE ORAL
Qty: 20 | Refills: 0 | Status: COMPLETED | COMMUNITY
Start: 2019-02-07 | End: 2019-05-06

## 2019-05-06 NOTE — DATA REVIEWED
[FreeTextEntry1] : pre/post spirometry performed. Fev 1 is 3.12 and 100 % with an FEV1/FVC 89% . \par Normal flow rates. \par

## 2019-05-06 NOTE — HISTORY OF PRESENT ILLNESS
[FreeTextEntry8] : Pt presents  to the office today . She was at [facility] ER 4 days ago with asthma exacerbation. She was in class  and felt the air was very hot and humid. She went home and took 2 puffs of Combivent with no relief and went to t the ER. There she was given Solu-Medrol IV, and  additional  nebulizer treatments. CXRY was negative. She was discharged on prednisone 50 mg po x 4 days , last dose was yesterday. She is maintained on  Spiriva Respimat 1.25 mcg/act 2 puffs daily and Advair Diskus 250-50 mcg/ dose and feels her asthma has been under better control since starting the Spiriva in January by Dr. Jolly. She has a nebulizer at home when needed. \par She denies fever, chills, cough, pleuritic pain, hemoptysis.   \par

## 2019-05-06 NOTE — ASSESSMENT
[FreeTextEntry1] : s/ p asthma exacerbation \par Continue Prednisone 20 mg po x 3 more days \par Continue current inhalers, Advair and Spiriva\par Albuterol via neb , PRN \par Appt with  Dr. Jolly 3 months with full PFT's \par Call / return as needed \par To ER with any emergent symptoms\par \par \par

## 2019-05-06 NOTE — REVIEW OF SYSTEMS
[Wheezing] : wheezing [Negative] : Genitourinary [Fever] : no fever [Chills] : no chills [Fatigue] : no fatigue

## 2019-05-06 NOTE — PHYSICAL EXAM
[No Acute Distress] : no acute distress [Well Nourished] : well nourished [Well Developed] : well developed [No Lymphadenopathy] : no lymphadenopathy [Supple] : supple [No Respiratory Distress] : no respiratory distress  [No Accessory Muscle Use] : no accessory muscle use [Normal Rate] : normal rate  [Regular Rhythm] : with a regular rhythm [Normal S1, S2] : normal S1 and S2 [Normal Posterior Cervical Nodes] : no posterior cervical lymphadenopathy [Normal Anterior Cervical Nodes] : no anterior cervical lymphadenopathy [Coordination Grossly Intact] : coordination grossly intact [Normal Gait] : normal gait [No Focal Deficits] : no focal deficits [Normal Affect] : the affect was normal [Normal Insight/Judgement] : insight and judgment were intact [Alert and Oriented x3] : oriented to person, place, and time [de-identified] : expiratory wheeze on forced maneuvers

## 2019-06-24 ENCOUNTER — RX RENEWAL (OUTPATIENT)
Age: 29
End: 2019-06-24

## 2019-07-02 ENCOUNTER — RX RENEWAL (OUTPATIENT)
Age: 29
End: 2019-07-02

## 2019-07-29 ENCOUNTER — MEDICATION RENEWAL (OUTPATIENT)
Age: 29
End: 2019-07-29

## 2019-09-19 ENCOUNTER — MEDICATION RENEWAL (OUTPATIENT)
Age: 29
End: 2019-09-19

## 2019-12-05 ENCOUNTER — MEDICATION RENEWAL (OUTPATIENT)
Age: 29
End: 2019-12-05

## 2019-12-10 ENCOUNTER — TRANSCRIPTION ENCOUNTER (OUTPATIENT)
Age: 29
End: 2019-12-10

## 2019-12-20 ENCOUNTER — APPOINTMENT (OUTPATIENT)
Dept: INTERNAL MEDICINE | Facility: CLINIC | Age: 29
End: 2019-12-20
Payer: COMMERCIAL

## 2019-12-20 ENCOUNTER — NON-APPOINTMENT (OUTPATIENT)
Age: 29
End: 2019-12-20

## 2019-12-20 VITALS
OXYGEN SATURATION: 99 % | HEIGHT: 63 IN | SYSTOLIC BLOOD PRESSURE: 118 MMHG | WEIGHT: 137 LBS | RESPIRATION RATE: 20 BRPM | DIASTOLIC BLOOD PRESSURE: 68 MMHG | TEMPERATURE: 98.1 F | HEART RATE: 122 BPM | BODY MASS INDEX: 24.27 KG/M2

## 2019-12-20 PROCEDURE — 99213 OFFICE O/P EST LOW 20 MIN: CPT | Mod: 25

## 2019-12-20 PROCEDURE — 94010 BREATHING CAPACITY TEST: CPT

## 2019-12-20 NOTE — HISTORY OF PRESENT ILLNESS
[FreeTextEntry1] : cough [de-identified] : -year-old female with a history of upper respiratory symptoms, sinus congestion, cough, was seen by her PCP 5 days ago and treated with a prednisone taper 40 mg per day, coming down 5 mg per day and Cipro for 10 days.  Her sinus congestion is improved some, however she continues to have cough and yellow sputum production. He also notes an occasional wheeze. Not having fever or chills or sweats.

## 2019-12-20 NOTE — PHYSICAL EXAM
[No Acute Distress] : no acute distress [Well Developed] : well developed [Well-Appearing] : well-appearing [Well Nourished] : well nourished [Normal Sclera/Conjunctiva] : normal sclera/conjunctiva [PERRL] : pupils equal round and reactive to light [EOMI] : extraocular movements intact [Normal Outer Ear/Nose] : the outer ears and nose were normal in appearance [Normal TMs] : both tympanic membranes were normal [No JVD] : no jugular venous distention [Normal Oropharynx] : the oropharynx was normal [No Lymphadenopathy] : no lymphadenopathy [Thyroid Normal, No Nodules] : the thyroid was normal and there were no nodules present [Supple] : supple [Clear to Auscultation] : lungs were clear to auscultation bilaterally [No Respiratory Distress] : no respiratory distress  [No Accessory Muscle Use] : no accessory muscle use [Normal S1, S2] : normal S1 and S2 [Regular Rhythm] : with a regular rhythm [Normal Rate] : normal rate  [No Extremity Clubbing/Cyanosis] : no extremity clubbing/cyanosis [Non-distended] : non-distended [Soft] : abdomen soft [Non Tender] : non-tender [Normal Bowel Sounds] : normal bowel sounds [No HSM] : no HSM [Normal Anterior Cervical Nodes] : no anterior cervical lymphadenopathy [No Rash] : no rash [Normal Gait] : normal gait [No Focal Deficits] : no focal deficits [Normal Affect] : the affect was normal [Normal Insight/Judgement] : insight and judgment were intact [de-identified] : very slight wheeze w/ FEM [de-identified] : NT sinus areas.

## 2019-12-20 NOTE — ASSESSMENT
[FreeTextEntry1] : #1 URI with acute bronchitis and mild asthma flare. Patient will ensure fluids. Robitussin syrup p.r.n. D/C Cipro and take doxycycline x5 days. Take total of 30 mg prednisone today. Then begin Medrol Dosepak tomorrow.  Call or return if symptoms are not improving/resolving. \par \par Continue Advair, Spiriva resimat, and montelukast for maintenance.  Combivent respimat 1 puff 4 times a day as needed for rescue. Continue nasal azelastine and fluticasone sprays.

## 2019-12-20 NOTE — DATA REVIEWED
[FreeTextEntry1] : Spirometry today is within normal limits with an FEV1 of 3.37 her 108% predicted.

## 2019-12-23 ENCOUNTER — MEDICATION RENEWAL (OUTPATIENT)
Age: 29
End: 2019-12-23

## 2020-01-06 ENCOUNTER — RX RENEWAL (OUTPATIENT)
Age: 30
End: 2020-01-06

## 2020-01-14 ENCOUNTER — TRANSCRIPTION ENCOUNTER (OUTPATIENT)
Age: 30
End: 2020-01-14

## 2020-03-02 ENCOUNTER — TRANSCRIPTION ENCOUNTER (OUTPATIENT)
Age: 30
End: 2020-03-02

## 2020-03-09 ENCOUNTER — APPOINTMENT (OUTPATIENT)
Dept: INTERNAL MEDICINE | Facility: CLINIC | Age: 30
End: 2020-03-09

## 2020-03-12 ENCOUNTER — NON-APPOINTMENT (OUTPATIENT)
Age: 30
End: 2020-03-12

## 2020-03-12 ENCOUNTER — APPOINTMENT (OUTPATIENT)
Dept: INTERNAL MEDICINE | Facility: CLINIC | Age: 30
End: 2020-03-12
Payer: COMMERCIAL

## 2020-03-12 VITALS
SYSTOLIC BLOOD PRESSURE: 114 MMHG | HEART RATE: 94 BPM | WEIGHT: 137 LBS | RESPIRATION RATE: 18 BRPM | HEIGHT: 63 IN | BODY MASS INDEX: 24.27 KG/M2 | OXYGEN SATURATION: 99 % | DIASTOLIC BLOOD PRESSURE: 78 MMHG | TEMPERATURE: 98.7 F

## 2020-03-12 PROCEDURE — 94060 EVALUATION OF WHEEZING: CPT

## 2020-03-12 PROCEDURE — 99214 OFFICE O/P EST MOD 30 MIN: CPT | Mod: 25

## 2020-03-12 RX ORDER — FEXOFENADINE HCL 60 MG
TABLET ORAL
Refills: 0 | Status: COMPLETED | COMMUNITY
End: 2020-03-12

## 2020-03-12 RX ORDER — PREDNISONE 20 MG/1
20 TABLET ORAL DAILY
Qty: 4 | Refills: 0 | Status: COMPLETED | COMMUNITY
Start: 2019-05-06 | End: 2020-03-12

## 2020-03-12 RX ORDER — CODEINE PHOSPHATE AND GUAIFENESIN 10; 100 MG/5ML; MG/5ML
100-10 LIQUID ORAL
Qty: 240 | Refills: 0 | Status: COMPLETED | COMMUNITY
Start: 2019-02-07 | End: 2020-03-12

## 2020-03-12 RX ORDER — METHYLPREDNISOLONE 4 MG/1
4 TABLET ORAL
Qty: 1 | Refills: 0 | Status: COMPLETED | COMMUNITY
Start: 2019-12-20 | End: 2020-03-12

## 2020-03-16 ENCOUNTER — TRANSCRIPTION ENCOUNTER (OUTPATIENT)
Age: 30
End: 2020-03-16

## 2020-03-16 NOTE — REVIEW OF SYSTEMS
[Fatigue] : fatigue [Wheezing] : wheezing [Cough] : cough [Dyspnea on Exertion] : dyspnea on exertion [Negative] : Neurological [Fever] : no fever [Chills] : no chills [Shortness Of Breath] : no shortness of breath

## 2020-03-16 NOTE — DATA REVIEWED
[FreeTextEntry1] : Pre/post spirometry performed. normal flow rates, post bronchodilator not improved.

## 2020-03-16 NOTE — HISTORY OF PRESENT ILLNESS
[FreeTextEntry8] : Pt presents  to the office today with complaints of  wheezing and chest tightness x 4 days . She went to her PCP and was prescribed Doxycycline 100 m po BID x 10 days and Prednisone 20 mg po daily. She feels the prednisone dose is not helping, she is still wheezing and feels like she " cant get a full breath.' She has a history of moderate persistent asthma. She began using the Albuterol via nebulizer more often over the last few days. She is getting partial relief. She denies fever, chills, purulent sputum , pleuritic pain or hemoptysis. \par  \par

## 2020-03-16 NOTE — ASSESSMENT
[FreeTextEntry1] : Continue Doxycycline til finished\par Increase Prednisone taper ( 60 mg po x 3 days, 40 mg po x 3 days , 20 mg po x 3 day ) \par Continue current medications \par Guanefesin AC 5-10 ml PRN bedtime , discussed may cause drowsiness \par Albuterol via neb, PRN \par Call / return if not improving \par To ER with emergent symptoms

## 2020-03-18 NOTE — ED ADULT NURSE NOTE - TIMING
----- Message from Chandana Dowell sent at 3/18/2020 11:59 AM CDT -----  Contact: pt  Type: Needs Medical Advice    Who Called:  pt    Best Call Back Number: [phone]   Additional Information: pt wants to know if needs to come in for today's visit at 2pm. 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 Please call to advise if need to reschedule, pt is open to video visit.     worsening

## 2020-03-23 ENCOUNTER — TRANSCRIPTION ENCOUNTER (OUTPATIENT)
Age: 30
End: 2020-03-23

## 2020-05-28 ENCOUNTER — TRANSCRIPTION ENCOUNTER (OUTPATIENT)
Age: 30
End: 2020-05-28

## 2020-06-15 ENCOUNTER — APPOINTMENT (OUTPATIENT)
Dept: INTERNAL MEDICINE | Facility: CLINIC | Age: 30
End: 2020-06-15

## 2020-06-22 ENCOUNTER — APPOINTMENT (OUTPATIENT)
Dept: INTERNAL MEDICINE | Facility: CLINIC | Age: 30
End: 2020-06-22
Payer: COMMERCIAL

## 2020-06-22 VITALS
TEMPERATURE: 99.4 F | RESPIRATION RATE: 16 BRPM | HEART RATE: 116 BPM | OXYGEN SATURATION: 97 % | SYSTOLIC BLOOD PRESSURE: 102 MMHG | HEIGHT: 63 IN | DIASTOLIC BLOOD PRESSURE: 68 MMHG | WEIGHT: 150 LBS | BODY MASS INDEX: 26.58 KG/M2

## 2020-06-22 PROCEDURE — 99213 OFFICE O/P EST LOW 20 MIN: CPT

## 2020-06-22 NOTE — ASSESSMENT
[FreeTextEntry1] : \par Will now DC Combivent for rescue as she is now on Spiriva QD.  She will now use Xopenex for rescue use.  \par \par Continue with other meds\par \par FU with  in 6 months with PFT

## 2020-06-22 NOTE — PHYSICAL EXAM
[Normal] : normal rate, regular rhythm, normal S1 and S2 and no murmur heard [No Edema] : there was no peripheral edema [No Extremity Clubbing/Cyanosis] : no extremity clubbing/cyanosis

## 2020-06-22 NOTE — HISTORY OF PRESENT ILLNESS
[de-identified] : Here for FU.  She was treated for Asthma exac back in March and required Prednisone taper.  Symptoms slowly improved but she did fully recover. No further exacerbations.    She has been sheltering in place for the most past during pandemic. She is using combivent before exercise.    [FreeTextEntry1] : FU Asthma

## 2020-07-13 ENCOUNTER — APPOINTMENT (OUTPATIENT)
Age: 30
End: 2020-07-13
Payer: COMMERCIAL

## 2020-07-13 VITALS
TEMPERATURE: 98.3 F | SYSTOLIC BLOOD PRESSURE: 122 MMHG | WEIGHT: 150 LBS | HEART RATE: 108 BPM | HEIGHT: 63 IN | RESPIRATION RATE: 16 BRPM | DIASTOLIC BLOOD PRESSURE: 70 MMHG | BODY MASS INDEX: 26.58 KG/M2 | OXYGEN SATURATION: 97 %

## 2020-07-13 DIAGNOSIS — J06.9 ACUTE UPPER RESPIRATORY INFECTION, UNSPECIFIED: ICD-10-CM

## 2020-07-13 DIAGNOSIS — J45.901 UNSPECIFIED ASTHMA WITH (ACUTE) EXACERBATION: ICD-10-CM

## 2020-07-13 DIAGNOSIS — J32.9 CHRONIC SINUSITIS, UNSPECIFIED: ICD-10-CM

## 2020-07-13 DIAGNOSIS — Z87.898 PERSONAL HISTORY OF OTHER SPECIFIED CONDITIONS: ICD-10-CM

## 2020-07-13 DIAGNOSIS — Z87.09 PERSONAL HISTORY OF OTHER DISEASES OF THE RESPIRATORY SYSTEM: ICD-10-CM

## 2020-07-13 PROCEDURE — 99214 OFFICE O/P EST MOD 30 MIN: CPT

## 2020-07-13 NOTE — REVIEW OF SYSTEMS
[Negative] : Neurological [Chills] : no chills [Fever] : no fever [Fatigue] : no fatigue [FreeTextEntry6] : see HPI

## 2020-07-13 NOTE — HISTORY OF PRESENT ILLNESS
[FreeTextEntry8] : Pt is a 29 yr old female with a h/ of  cough variant asthma, Low  serum  IGA .  Two weeks ago she was having increased wheezing due to the humidity , she went back to using  Combivent three times a day which relieved  her symptoms. She no longer feels the chest tightness and wheezing has resolved . She kept todays' appt ,   to " get checked.' Explained we are unable to do spirometry due to the coronavirus at this time.  O2 sat- 97% RA today.  \par She feels more relief from Combivent than Xopenex PRN.  \par She denies feeling ill, cough, wheeze or sputum production. She has been doing online classes at home and rarely leaves the house. She has had no positive COVID contacts.

## 2020-07-13 NOTE — PHYSICAL EXAM
[No Acute Distress] : no acute distress [Well Developed] : well developed [Normal Oropharynx] : the oropharynx was normal [Well Nourished] : well nourished [Supple] : supple [No Lymphadenopathy] : no lymphadenopathy [No Accessory Muscle Use] : no accessory muscle use [No Respiratory Distress] : no respiratory distress  [Normal Rate] : normal rate  [Clear to Auscultation] : lungs were clear to auscultation bilaterally [Normal S1, S2] : normal S1 and S2 [Regular Rhythm] : with a regular rhythm [Normal Posterior Cervical Nodes] : no posterior cervical lymphadenopathy [Normal Anterior Cervical Nodes] : no anterior cervical lymphadenopathy [Coordination Grossly Intact] : coordination grossly intact [No Focal Deficits] : no focal deficits [Normal Gait] : normal gait [Normal Affect] : the affect was normal [Alert and Oriented x3] : oriented to person, place, and time [Normal Insight/Judgement] : insight and judgment were intact

## 2020-07-19 NOTE — PROGRESS NOTE ADULT - PROBLEM SELECTOR PLAN 1
Shared goal for the day as to eat lunch. .                                                                      Group Therapy Note    Date: 7/19/2020    Group Start Time: 1020  Group End Time: 1100  Group Topic: Psychoeducation    [facility] 7SE ACUTE BH 1    [facility], [address]        Group Therapy Note                                                  Date: 7/19/2020    Type of Group: Psychoeducation    Wellness Binder Information  Subject:dimensions of wellness   Patient's Goal:  patient will be able to identify what he/she can work on to find balance in his/her life. Notes:  pleasant and sharing in group. Status After Intervention:  Improved    Participation Level:  Active Listener and Interactive    Participation Quality: Appropriate, Attentive, Sharing, and Supportive      Speech:  normal       Thought Process/Content: Logical      Affective Functioning: Congruent      Mood: euthymic      Level of consciousness:  Alert, Oriented x4, and Attentive      Response to Learning: Able to verbalize/acknowledge new learning, Able to retain information, and Progressing to goal      Endings: None Reported    Modes of Intervention: Education, Support, Socialization, Exploration, and Problem-solving      Discipline Responsible: Psychoeducational Specialist      Signature:  Subhash Mccullough Likely related to recent antibiotic use  admit to med surg  advnace diet as tolerated  c/w zofran and ppi  dc c.diff precautions- very unlikely can send sample if able to obtain

## 2020-07-30 ENCOUNTER — TRANSCRIPTION ENCOUNTER (OUTPATIENT)
Age: 30
End: 2020-07-30

## 2020-08-03 ENCOUNTER — APPOINTMENT (OUTPATIENT)
Dept: INTERNAL MEDICINE | Facility: CLINIC | Age: 30
End: 2020-08-03

## 2020-08-10 ENCOUNTER — APPOINTMENT (OUTPATIENT)
Dept: RADIOLOGY | Facility: CLINIC | Age: 30
End: 2020-08-10
Payer: COMMERCIAL

## 2020-08-10 ENCOUNTER — OUTPATIENT (OUTPATIENT)
Dept: OUTPATIENT SERVICES | Facility: HOSPITAL | Age: 30
LOS: 1 days | End: 2020-08-10
Payer: COMMERCIAL

## 2020-08-10 ENCOUNTER — APPOINTMENT (OUTPATIENT)
Dept: INTERNAL MEDICINE | Facility: CLINIC | Age: 30
End: 2020-08-10
Payer: COMMERCIAL

## 2020-08-10 VITALS
DIASTOLIC BLOOD PRESSURE: 78 MMHG | RESPIRATION RATE: 24 BRPM | SYSTOLIC BLOOD PRESSURE: 128 MMHG | OXYGEN SATURATION: 97 % | WEIGHT: 148 LBS | HEIGHT: 63 IN | TEMPERATURE: 98.6 F | HEART RATE: 145 BPM | BODY MASS INDEX: 26.22 KG/M2

## 2020-08-10 DIAGNOSIS — Z86.011 PERSONAL HISTORY OF BENIGN NEOPLASM OF THE BRAIN: Chronic | ICD-10-CM

## 2020-08-10 DIAGNOSIS — Z98.890 OTHER SPECIFIED POSTPROCEDURAL STATES: Chronic | ICD-10-CM

## 2020-08-10 DIAGNOSIS — J45.991 COUGH VARIANT ASTHMA: ICD-10-CM

## 2020-08-10 DIAGNOSIS — Z87.01 PERSONAL HISTORY OF PNEUMONIA (RECURRENT): ICD-10-CM

## 2020-08-10 PROCEDURE — 96372 THER/PROPH/DIAG INJ SC/IM: CPT

## 2020-08-10 PROCEDURE — 71046 X-RAY EXAM CHEST 2 VIEWS: CPT | Mod: 26

## 2020-08-10 PROCEDURE — 71046 X-RAY EXAM CHEST 2 VIEWS: CPT

## 2020-08-10 PROCEDURE — 99214 OFFICE O/P EST MOD 30 MIN: CPT | Mod: 25

## 2020-08-10 RX ORDER — TRIAMCINOLONE ACETONIDE 40 MG/ML
40 SUSPENSION INTRA-ARTERIAL; INTRAMUSCULAR
Qty: 1 | Refills: 0 | Status: COMPLETED | OUTPATIENT
Start: 2020-08-10

## 2020-08-10 RX ADMIN — TRIAMCINOLONE ACETONIDE 1.5 MG/ML: 40 INJECTION, SUSPENSION INTRA-ARTICULAR; INTRAMUSCULAR at 00:00

## 2020-08-10 NOTE — PHYSICAL EXAM
[Well Nourished] : well nourished [No Acute Distress] : no acute distress [No Lymphadenopathy] : no lymphadenopathy [Supple] : supple [Well Developed] : well developed [No Respiratory Distress] : no respiratory distress  [Normal Rate] : normal rate  [No Accessory Muscle Use] : no accessory muscle use [Normal S1, S2] : normal S1 and S2 [Regular Rhythm] : with a regular rhythm [Normal Posterior Cervical Nodes] : no posterior cervical lymphadenopathy [Normal Anterior Cervical Nodes] : no anterior cervical lymphadenopathy [Coordination Grossly Intact] : coordination grossly intact [Normal Gait] : normal gait [No Focal Deficits] : no focal deficits [Alert and Oriented x3] : oriented to person, place, and time [Normal Insight/Judgement] : insight and judgment were intact [Normal Affect] : the affect was normal [de-identified] : no wheezing , CTA , decreased at bases

## 2020-08-10 NOTE — REVIEW OF SYSTEMS
[Fever] : fever [Fatigue] : fatigue [Cough] : cough [Wheezing] : wheezing [Dyspnea on Exertion] : dyspnea on exertion [Negative] : Neurological [Chills] : no chills [FreeTextEntry6] : see HPI

## 2020-08-10 NOTE — HISTORY OF PRESENT ILLNESS
[FreeTextEntry8] : Pt presents  to the office today for evaluation of chest tightness , wheezing, deep cough  x 2 weeks. Has had low grade fever - 99.2F. \par She went to her PCP last week and prescribed Z sid. completed this am . Sputum was greenish, thick. She had a COVID PCR, negative result. Dr. Jolly prescribed Prednisone 80 mg po x 2 days . pt took it Friday and Saturday and felt better . Yesterday she took 60 mg po Prednisone. This am she took 40 mg and started to feel "tight in the chest again. " She has been using the Budesonide BID and Generic Duoneb q 6 hrs via nebulizer the last few days in place of Advair and Spiriva. \par Her cough is less productive today, drier.  She is afebrile in the office today. O2 sat on room air- 97% . \par

## 2020-08-11 NOTE — ED STATDOCS - CROS ED NEURO ALL NEG
I have personally evaluated and examined the patient. The Attending was available to me as a supervising provider if needed. negative...

## 2020-08-12 ENCOUNTER — TRANSCRIPTION ENCOUNTER (OUTPATIENT)
Age: 30
End: 2020-08-12

## 2020-08-17 ENCOUNTER — TRANSCRIPTION ENCOUNTER (OUTPATIENT)
Age: 30
End: 2020-08-17

## 2020-08-19 ENCOUNTER — TRANSCRIPTION ENCOUNTER (OUTPATIENT)
Age: 30
End: 2020-08-19

## 2020-08-24 ENCOUNTER — TRANSCRIPTION ENCOUNTER (OUTPATIENT)
Age: 30
End: 2020-08-24

## 2020-08-26 ENCOUNTER — TRANSCRIPTION ENCOUNTER (OUTPATIENT)
Age: 30
End: 2020-08-26

## 2020-10-23 ENCOUNTER — APPOINTMENT (OUTPATIENT)
Age: 30
End: 2020-10-23

## 2020-10-23 ENCOUNTER — RX RENEWAL (OUTPATIENT)
Age: 30
End: 2020-10-23

## 2020-12-15 ENCOUNTER — TRANSCRIPTION ENCOUNTER (OUTPATIENT)
Age: 30
End: 2020-12-15

## 2021-01-04 ENCOUNTER — APPOINTMENT (OUTPATIENT)
Dept: INTERNAL MEDICINE | Facility: CLINIC | Age: 31
End: 2021-01-04
Payer: COMMERCIAL

## 2021-01-04 VITALS
WEIGHT: 147 LBS | SYSTOLIC BLOOD PRESSURE: 118 MMHG | TEMPERATURE: 98.3 F | OXYGEN SATURATION: 97 % | BODY MASS INDEX: 26.05 KG/M2 | DIASTOLIC BLOOD PRESSURE: 82 MMHG | HEIGHT: 63 IN | HEART RATE: 110 BPM | RESPIRATION RATE: 18 BRPM

## 2021-01-04 DIAGNOSIS — J31.0 CHRONIC RHINITIS: ICD-10-CM

## 2021-01-04 PROCEDURE — 99215 OFFICE O/P EST HI 40 MIN: CPT

## 2021-01-04 PROCEDURE — 99072 ADDL SUPL MATRL&STAF TM PHE: CPT

## 2021-01-04 RX ORDER — TIOTROPIUM BROMIDE INHALATION SPRAY 1.56 UG/1
1.25 SPRAY, METERED RESPIRATORY (INHALATION)
Qty: 1 | Refills: 1 | Status: DISCONTINUED | COMMUNITY
Start: 2019-01-14 | End: 2021-01-04

## 2021-01-04 RX ORDER — DOXYCYCLINE HYCLATE 100 MG/1
100 CAPSULE ORAL
Qty: 14 | Refills: 0 | Status: DISCONTINUED | COMMUNITY
Start: 2018-05-22 | End: 2021-01-04

## 2021-01-04 RX ORDER — PREDNISONE 20 MG/1
20 TABLET ORAL
Qty: 20 | Refills: 0 | Status: DISCONTINUED | COMMUNITY
Start: 2020-08-10 | End: 2021-01-04

## 2021-01-04 RX ORDER — GUAIFENESIN AND CODEINE PHOSPHATE 10; 100 MG/5ML; MG/5ML
100-10 SOLUTION ORAL
Qty: 1 | Refills: 0 | Status: DISCONTINUED | COMMUNITY
Start: 2020-08-10 | End: 2021-01-04

## 2021-01-04 RX ORDER — PREDNISONE 20 MG/1
20 TABLET ORAL
Qty: 7 | Refills: 0 | Status: DISCONTINUED | COMMUNITY
Start: 2020-08-07 | End: 2021-01-04

## 2021-01-04 NOTE — REVIEW OF SYSTEMS
[Postnasal Drip] : postnasal drip [Negative] : Heme/Lymph [FreeTextEntry4] : see history of present illness [FreeTextEntry6] : see history of present illness

## 2021-01-04 NOTE — HISTORY OF PRESENT ILLNESS
[FreeTextEntry1] : Reassessment for asthma [de-identified] : The patient comes in today for a followup evaluation and yearly reassessment of her asthma.\par \par At this time, the patient states that she is doing well. She did have a difficult time over the early summer months, between June and August. She required several visits to the office, as well as several courses of prednisone. Eventually, she was given a dosage of 80 mg by myself with protracted taper, and the symptoms resolved. Since then, she has been doing very well. She remains compliant with her regimen of Advair, Spiriva, and Singulair. She uses Combivent for rescue. She does use it approximately 3 times a day.\par \par The patient has been exercising by riding a bicycle. Her issues with chronic rhinitis and sinusitis wax and wane. At times, the postnasal drip can lead to a chronic cough. She denies any shortness of breath or significant dyspnea on exertion. She now comes in for this assessment

## 2021-01-04 NOTE — PHYSICAL EXAM
[No Acute Distress] : no acute distress [Well Nourished] : well nourished [Well Developed] : well developed [Well-Appearing] : well-appearing [Normal Outer Ear/Nose] : the outer ears and nose were normal in appearance [Normal Oropharynx] : the oropharynx was normal [Normal TMs] : both tympanic membranes were normal [Normal Nasal Mucosa] : the nasal mucosa was normal [No Respiratory Distress] : no respiratory distress  [Clear to Auscultation] : lungs were clear to auscultation bilaterally [No Accessory Muscle Use] : no accessory muscle use [Normal Rate] : normal rate  [Regular Rhythm] : with a regular rhythm [Normal S1, S2] : normal S1 and S2 [Pedal Pulses Present] : the pedal pulses are present [Normal Posterior Cervical Nodes] : no posterior cervical lymphadenopathy [Normal Anterior Cervical Nodes] : no anterior cervical lymphadenopathy [Coordination Grossly Intact] : coordination grossly intact [No Focal Deficits] : no focal deficits [Normal Affect] : the affect was normal [Normal Insight/Judgement] : insight and judgment were intact

## 2021-01-08 ENCOUNTER — APPOINTMENT (OUTPATIENT)
Dept: INTERNAL MEDICINE | Facility: CLINIC | Age: 31
End: 2021-01-08

## 2021-01-22 ENCOUNTER — RX RENEWAL (OUTPATIENT)
Age: 31
End: 2021-01-22

## 2021-02-03 LAB
EOSINOPHIL # BLD MANUAL: 230 /UL
SARS-COV-2 N GENE NPH QL NAA+PROBE: NOT DETECTED

## 2021-02-04 ENCOUNTER — NON-APPOINTMENT (OUTPATIENT)
Age: 31
End: 2021-02-04

## 2021-02-05 ENCOUNTER — APPOINTMENT (OUTPATIENT)
Dept: INTERNAL MEDICINE | Facility: CLINIC | Age: 31
End: 2021-02-05
Payer: COMMERCIAL

## 2021-02-05 VITALS
WEIGHT: 150 LBS | OXYGEN SATURATION: 99 % | RESPIRATION RATE: 18 BRPM | DIASTOLIC BLOOD PRESSURE: 75 MMHG | SYSTOLIC BLOOD PRESSURE: 118 MMHG | HEART RATE: 124 BPM | HEIGHT: 63 IN | TEMPERATURE: 99.1 F | BODY MASS INDEX: 26.58 KG/M2

## 2021-02-05 PROCEDURE — 94729 DIFFUSING CAPACITY: CPT

## 2021-02-05 PROCEDURE — 99072 ADDL SUPL MATRL&STAF TM PHE: CPT

## 2021-02-05 PROCEDURE — 94060 EVALUATION OF WHEEZING: CPT

## 2021-02-05 PROCEDURE — 94727 GAS DIL/WSHOT DETER LNG VOL: CPT

## 2021-02-08 ENCOUNTER — NON-APPOINTMENT (OUTPATIENT)
Age: 31
End: 2021-02-08

## 2021-02-11 ENCOUNTER — APPOINTMENT (OUTPATIENT)
Dept: INTERNAL MEDICINE | Facility: CLINIC | Age: 31
End: 2021-02-11

## 2021-03-03 ENCOUNTER — RX RENEWAL (OUTPATIENT)
Age: 31
End: 2021-03-03

## 2021-03-15 NOTE — ED PROVIDER NOTE - NEURO NEGATIVE STATEMENT, MLM
Flip Del Rio verbally consents to a [employer] on 03/15/21. Patient understands and accepts financial responsibility for any deductible, co-insurance and/or co-pays associated with this service.  This visit is conducted using [employer] Progressively his symptoms got worse. Started to have more coughing and shortness of breath. Patient was referred to emergency room.   Was found to have bilateral pneumonia related to COVID-19 patient was subsequently admitted to the hospital.  Patient w on file prior to visit. HISTORY: reconciled and reviewed with patient  He  has a past medical history of Arthritis, Hepatitis A, High blood pressure, and LBP (low back pain). He  has no past surgical history on file.     He family history includes Pro-Beta Natriuretic Peptide 29 <125 pg/mL   COMP METABOLIC PANEL (14)   Result Value Ref Range    Glucose 117 (H) 70 - 99 mg/dL    Sodium 128 (L) 136 - 145 mmol/L    Potassium 3.6 3.5 - 5.1 mmol/L    Chloride 96 (L) 98 - 112 mmol/L    CO2 24.0 21.0 - 3 Seen /HPF    Squamous Epi.  Cells None Seen None Seen /HPF    Renal Tubular Epithelial Cells None Seen None Seen /HPF    Transitional Cells None Seen None Seen /HPF    Yeast Urine None Seen None Seen /HPF   COMP METABOLIC PANEL (14)   Result Value Ref Range 10.0 - 20.0    Calcium, Total 8.4 (L) 8.5 - 10.1 mg/dL    Calculated Osmolality 281 275 - 295 mOsm/kg    GFR, Non- 105 >=60    GFR, -American 122 >=60    AST 62 (H) 15 - 37 U/L    ALT 85 (H) 16 - 61 U/L    Alkaline Phosphatase 67 45 5.1 mmol/L    Chloride 105 98 - 112 mmol/L    CO2 25.0 21.0 - 32.0 mmol/L    Anion Gap 5 0 - 18 mmol/L    BUN 15 7 - 18 mg/dL    Creatinine 0.51 (L) 0.70 - 1.30 mg/dL    BUN/CREA Ratio 29.4 (H) 10.0 - 20.0    Calcium, Total 8.7 8.5 - 10.1 mg/dL    Calculat BLOOD TYPE Positive    ANTIBODY SCREEN   Result Value Ref Range    Antibody Screen Negative    RAINBOW DRAW BLUE   Result Value Ref Range    Hold Blue Auto Resulted    RAINBOW DRAW LAVENDER   Result Value Ref Range    Hold Lavender Auto Resulted    RAINBOW 7.70 x10 (3) uL    Neutrophil Absolute 7.54 1.50 - 7.70 x10(3) uL    Lymphocyte Absolute 0.90 (L) 1.00 - 4.00 x10(3) uL    Monocyte Absolute 0.58 0.10 - 1.00 x10(3) uL    Eosinophil Absolute 0.00 0.00 - 0.70 x10(3) uL    Basophil Absolute 0.01 0.00 - 0.20 0.20 x10(3) uL    Immature Granulocyte Absolute 0.05 0.00 - 1.00 x10(3) uL    Neutrophil % 77.1 %    Lymphocyte % 14.2 %    Monocyte % 7.9 %    Eosinophil % 0.0 %    Basophil % 0.1 %    Immature Granulocyte % 0.7 %     PROCEDURE:  MRI BRAIN, MRA BRAIN&NECK basilar artery has a normal course and caliber.  The bilateral vertebral arteries are unremarkable.         There is a 3-vessel aortic arch.  The origins of the branch vessels appear widely patent.  The bilateral subclavian arteries and innominate artery a to the ACR ([facility]) NRDR ([address]) which includes the Dose Index Registry.       PATIENT STATED HISTORY:(As transcribed by Technologist) Omid Persaud +OSWALDO        CONTRAST USED:  100cc of Omnipaque 350     30.0-34. 9)    Essential hypertension  -     lisinopril 10 MG Oral Tab; Take 1 tablet (10 mg total) by mouth daily.     Elevated liver function tests    Pneumonia  COVID-19 patient is feeling better monitor symptoms check x-ray mid April for resolution of th Management Options: severe  · Amount and/or Complexity of Data to Be Reviewed: severe  · Risk of Significant Complications, Morbidity, and/or Mortality: severe    Overall Risk:   severe    Patient seen within 7 days from date of discharge.      Vanessa Jackson no loss of consciousness, no gait abnormality, no headache, no sensory deficits, and no weakness.

## 2021-03-19 ENCOUNTER — NON-APPOINTMENT (OUTPATIENT)
Age: 31
End: 2021-03-19

## 2021-03-23 ENCOUNTER — APPOINTMENT (OUTPATIENT)
Dept: INTERNAL MEDICINE | Facility: CLINIC | Age: 31
End: 2021-03-23
Payer: COMMERCIAL

## 2021-03-23 DIAGNOSIS — Z20.822 ENCOUNTER FOR PREPROCEDURAL LABORATORY EXAMINATION: ICD-10-CM

## 2021-03-23 DIAGNOSIS — Z01.812 ENCOUNTER FOR PREPROCEDURAL LABORATORY EXAMINATION: ICD-10-CM

## 2021-03-23 PROCEDURE — 99441: CPT

## 2021-03-31 ENCOUNTER — TRANSCRIPTION ENCOUNTER (OUTPATIENT)
Age: 31
End: 2021-03-31

## 2021-03-31 ENCOUNTER — APPOINTMENT (OUTPATIENT)
Dept: INTERNAL MEDICINE | Facility: CLINIC | Age: 31
End: 2021-03-31
Payer: COMMERCIAL

## 2021-03-31 VITALS
OXYGEN SATURATION: 98 % | HEIGHT: 63 IN | WEIGHT: 148 LBS | BODY MASS INDEX: 26.22 KG/M2 | HEART RATE: 112 BPM | RESPIRATION RATE: 16 BRPM | TEMPERATURE: 97.4 F | SYSTOLIC BLOOD PRESSURE: 122 MMHG | DIASTOLIC BLOOD PRESSURE: 68 MMHG

## 2021-03-31 DIAGNOSIS — Z87.19 PERSONAL HISTORY OF OTHER DISEASES OF THE DIGESTIVE SYSTEM: ICD-10-CM

## 2021-03-31 PROCEDURE — 99214 OFFICE O/P EST MOD 30 MIN: CPT | Mod: 25

## 2021-03-31 PROCEDURE — 99072 ADDL SUPL MATRL&STAF TM PHE: CPT

## 2021-03-31 PROCEDURE — 96372 THER/PROPH/DIAG INJ SC/IM: CPT

## 2021-03-31 RX ORDER — TRIAMCINOLONE ACETONIDE 40 MG/ML
40 SUSPENSION INTRA-ARTERIAL; INTRAMUSCULAR
Qty: 1 | Refills: 0 | Status: COMPLETED | OUTPATIENT
Start: 2021-03-31

## 2021-03-31 NOTE — ASSESSMENT
[FreeTextEntry1] : Kenalog 60 mg IM administered \par Increase Prednisone taper to 40 mg po x 3 more days, then decrease to 20 mg po daily x 4 \par Continue Budesonide BID, Duoneb via nebs \par To ER with emergent symptoms

## 2021-03-31 NOTE — REVIEW OF SYSTEMS
[Wheezing] : wheezing [Cough] : cough [Dyspnea on Exertion] : dyspnea on exertion [Negative] : Psychiatric [Fever] : no fever [Chills] : no chills [Fatigue] : no fatigue [FreeTextEntry6] : see HPI

## 2021-03-31 NOTE — PHYSICAL EXAM
[No Acute Distress] : no acute distress [Well Nourished] : well nourished [Well Developed] : well developed [Normal Outer Ear/Nose] : the outer ears and nose were normal in appearance [Normal Oropharynx] : the oropharynx was normal [Normal TMs] : both tympanic membranes were normal [Supple] : supple [No Respiratory Distress] : no respiratory distress  [No Accessory Muscle Use] : no accessory muscle use [Clear to Auscultation] : lungs were clear to auscultation bilaterally [Normal Rate] : normal rate  [Regular Rhythm] : with a regular rhythm [Normal S1, S2] : normal S1 and S2 [Normal Posterior Cervical Nodes] : no posterior cervical lymphadenopathy [Normal Anterior Cervical Nodes] : no anterior cervical lymphadenopathy [Coordination Grossly Intact] : coordination grossly intact [No Focal Deficits] : no focal deficits [Normal Gait] : normal gait [Normal Affect] : the affect was normal [Alert and Oriented x3] : oriented to person, place, and time [Normal Insight/Judgement] : insight and judgment were intact

## 2021-04-28 ENCOUNTER — APPOINTMENT (OUTPATIENT)
Dept: GASTROENTEROLOGY | Facility: CLINIC | Age: 31
End: 2021-04-28

## 2021-05-12 ENCOUNTER — APPOINTMENT (OUTPATIENT)
Age: 31
End: 2021-05-12
Payer: COMMERCIAL

## 2021-05-12 ENCOUNTER — TRANSCRIPTION ENCOUNTER (OUTPATIENT)
Age: 31
End: 2021-05-12

## 2021-05-12 DIAGNOSIS — J45.909 UNSPECIFIED ASTHMA, UNCOMPLICATED: ICD-10-CM

## 2021-05-12 PROCEDURE — 99213 OFFICE O/P EST LOW 20 MIN: CPT | Mod: 95

## 2021-05-12 RX ORDER — PREDNISONE 20 MG/1
20 TABLET ORAL
Qty: 8 | Refills: 0 | Status: COMPLETED | COMMUNITY
Start: 2021-03-31 | End: 2021-05-12

## 2021-05-12 RX ORDER — PREDNISONE 20 MG/1
20 TABLET ORAL
Qty: 26 | Refills: 0 | Status: COMPLETED | COMMUNITY
Start: 2019-02-07 | End: 2021-05-12

## 2021-05-12 NOTE — HISTORY OF PRESENT ILLNESS
[Home] : at home, [unfilled] , at the time of the visit. [Medical Office: ([facility])___] : at the medical office located in  [Verbal consent obtained from patient] : the patient, [unfilled] [FreeTextEntry1] : f/up  [de-identified] : Pt is a 30 yr. old female with a h/ of moderate persistent asthma, allergic rhinitis, eosinophilic lung. She called as she was ill in April with asthma tic bronchitis and used up her Combivent inhaler. She is not due for renewal with insurance Until June 11 th. She  states, My allergies are bothering me with the weather change to Spring and i have been using  the Combivent QID more often. She feel s this helps more than Albuterol HFA. . 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 She c/ of increased clear mucous "dripping down her throat.' . She denies wheezing, shortness of breath or cough.  \par

## 2021-05-12 NOTE — ASSESSMENT
[FreeTextEntry1] : allergic rhinitis, asthma\par Will renew Combivent QID \par Use Levalbuterol QID PRN for rescue \par Antihistamine , Allegra/ Claritin for allergy symptoms \par Pt instructed to make appt for 6 month f/up with  \par \par

## 2021-05-12 NOTE — PHYSICAL EXAM
[No Acute Distress] : no acute distress [Well Nourished] : well nourished [Well Developed] : well developed [No Respiratory Distress] : no respiratory distress  [Normal Affect] : the affect was normal [Alert and Oriented x3] : oriented to person, place, and time [Normal Insight/Judgement] : insight and judgment were intact

## 2021-05-12 NOTE — HISTORY OF PRESENT ILLNESS
[Home] : at home, [unfilled] , at the time of the visit. [Medical Office: ([facility])___] : at the medical office located in  [Verbal consent obtained from patient] : the patient, [unfilled] [FreeTextEntry1] : f/up  [de-identified] : Pt is a 30 yr. old female with a h/ of moderate persistent asthma, allergic rhinitis, eosinophilic lung. She called as she was ill in April with asthma tic bronchitis and used up her Combivent inhaler. She is not due for renewal with insurance Until June 11 th. She  states, My allergies are bothering me with the weather change to Spring and i have been using  the Combivent QID more often. She feel s this helps more than Albuterol HFA. . 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 She c/ of increased clear mucous "dripping down her throat.' . She denies wheezing, shortness of breath or cough.  \par

## 2021-05-12 NOTE — REVIEW OF SYSTEMS
[Fever] : no fever [Chills] : no chills [Fatigue] : no fatigue [Nasal Discharge] : nasal discharge [Sore Throat] : no sore throat [Postnasal Drip] : postnasal drip [Shortness Of Breath] : no shortness of breath [Wheezing] : no wheezing [Cough] : no cough [Dyspnea on Exertion] : no dyspnea on exertion [Negative] : Psychiatric

## 2021-06-27 ENCOUNTER — RX RENEWAL (OUTPATIENT)
Age: 31
End: 2021-06-27

## 2021-07-07 ENCOUNTER — TRANSCRIPTION ENCOUNTER (OUTPATIENT)
Age: 31
End: 2021-07-07

## 2021-07-08 ENCOUNTER — RX RENEWAL (OUTPATIENT)
Age: 31
End: 2021-07-08

## 2021-07-27 ENCOUNTER — RX RENEWAL (OUTPATIENT)
Age: 31
End: 2021-07-27

## 2021-07-27 ENCOUNTER — TRANSCRIPTION ENCOUNTER (OUTPATIENT)
Age: 31
End: 2021-07-27

## 2021-08-25 ENCOUNTER — NON-APPOINTMENT (OUTPATIENT)
Age: 31
End: 2021-08-25

## 2021-08-25 ENCOUNTER — TRANSCRIPTION ENCOUNTER (OUTPATIENT)
Age: 31
End: 2021-08-25

## 2021-08-26 ENCOUNTER — TRANSCRIPTION ENCOUNTER (OUTPATIENT)
Age: 31
End: 2021-08-26

## 2021-09-27 ENCOUNTER — RX RENEWAL (OUTPATIENT)
Age: 31
End: 2021-09-27

## 2021-10-27 ENCOUNTER — TRANSCRIPTION ENCOUNTER (OUTPATIENT)
Age: 31
End: 2021-10-27

## 2021-12-15 ENCOUNTER — APPOINTMENT (OUTPATIENT)
Dept: INTERNAL MEDICINE | Facility: CLINIC | Age: 31
End: 2021-12-15
Payer: COMMERCIAL

## 2021-12-15 VITALS
DIASTOLIC BLOOD PRESSURE: 64 MMHG | WEIGHT: 154 LBS | HEART RATE: 114 BPM | HEIGHT: 63 IN | TEMPERATURE: 99.4 F | BODY MASS INDEX: 27.29 KG/M2 | OXYGEN SATURATION: 99 % | SYSTOLIC BLOOD PRESSURE: 108 MMHG

## 2021-12-15 PROCEDURE — 96372 THER/PROPH/DIAG INJ SC/IM: CPT

## 2021-12-15 PROCEDURE — 99214 OFFICE O/P EST MOD 30 MIN: CPT | Mod: 25

## 2021-12-15 RX ORDER — TRIAMCINOLONE ACETONIDE 40 MG/ML
40 SUSPENSION INTRA-ARTERIAL; INTRAMUSCULAR
Qty: 1 | Refills: 0 | Status: COMPLETED | OUTPATIENT
Start: 2021-12-15

## 2021-12-15 RX ADMIN — TRIAMCINOLONE ACETONIDE 1.5 MG/ML: 40 INJECTION, SUSPENSION INTRA-ARTICULAR; INTRAMUSCULAR at 00:00

## 2021-12-15 NOTE — ASSESSMENT
[FreeTextEntry1] : 1. asthma flare \par Kenalog 60 mg IM administered\par prednisone increase taper  to 60 mg  po x 3 days, 40 mg po x 3 , 20 mg po x 3 \par Continue Combivent QID PRN, Neb txs.  PRN \par Continue maintenance inhaler, Spiriva, Advair  \par Hold antbx. at this time, consider if develops purulent sputum ect.  \par Call if not improving \par Symptoms to warrant ER attention discussed \par

## 2021-12-15 NOTE — REVIEW OF SYSTEMS
[Fever] : no fever [Chills] : no chills [Fatigue] : no fatigue [Shortness Of Breath] : no shortness of breath [Wheezing] : wheezing [Cough] : cough [Dyspnea on Exertion] : no dyspnea on exertion [Negative] : Psychiatric

## 2021-12-15 NOTE — PHYSICAL EXAM
[No Acute Distress] : no acute distress [Well Nourished] : well nourished [Well Developed] : well developed [No Lymphadenopathy] : no lymphadenopathy [Supple] : supple [No Respiratory Distress] : no respiratory distress  [No Accessory Muscle Use] : no accessory muscle use [Normal Rate] : normal rate  [Regular Rhythm] : with a regular rhythm [Normal S1, S2] : normal S1 and S2 [Pedal Pulses Present] : the pedal pulses are present [No Extremity Clubbing/Cyanosis] : no extremity clubbing/cyanosis [Soft] : abdomen soft [Non Tender] : non-tender [Non-distended] : non-distended [Normal Bowel Sounds] : normal bowel sounds [Normal Posterior Cervical Nodes] : no posterior cervical lymphadenopathy [Normal Anterior Cervical Nodes] : no anterior cervical lymphadenopathy [Coordination Grossly Intact] : coordination grossly intact [No Focal Deficits] : no focal deficits [Normal Gait] : normal gait [Normal Affect] : the affect was normal [Alert and Oriented x3] : oriented to person, place, and time [Normal Insight/Judgement] : insight and judgment were intact [de-identified] :  expiratory wheeze noted

## 2021-12-15 NOTE — HISTORY OF PRESENT ILLNESS
[FreeTextEntry8] : Pt is a 30 yr. old female with a h/ of moderate persistent asthma, allergic rhinitis, eosinophilic lung. She presents today with c/ of wheezing and dry cough for a few days. She went to [facility] urgent Care in [place] last night and was given a script for prednisone taper ( 60 m g po x 2 days, 40 mg po x 2 , 20 mg po x 2 days.) . She states her allergies are bothering her recently and has been using her Combivent QID more often. She took the 1s t dose of prednisone 60 mg po today . Rapid COVID in Urgent Care was negative. She is requesting Kenalog injection. \par Denies fever, chills, purulent sputum, SOB.

## 2021-12-16 ENCOUNTER — TRANSCRIPTION ENCOUNTER (OUTPATIENT)
Age: 31
End: 2021-12-16

## 2021-12-21 ENCOUNTER — TRANSCRIPTION ENCOUNTER (OUTPATIENT)
Age: 31
End: 2021-12-21

## 2022-01-03 ENCOUNTER — TRANSCRIPTION ENCOUNTER (OUTPATIENT)
Age: 32
End: 2022-01-03

## 2022-01-05 ENCOUNTER — RX RENEWAL (OUTPATIENT)
Age: 32
End: 2022-01-05

## 2022-01-07 ENCOUNTER — TRANSCRIPTION ENCOUNTER (OUTPATIENT)
Age: 32
End: 2022-01-07

## 2022-01-14 ENCOUNTER — TRANSCRIPTION ENCOUNTER (OUTPATIENT)
Age: 32
End: 2022-01-14

## 2022-01-14 ENCOUNTER — APPOINTMENT (OUTPATIENT)
Dept: INTERNAL MEDICINE | Facility: CLINIC | Age: 32
End: 2022-01-14
Payer: COMMERCIAL

## 2022-01-14 ENCOUNTER — EMERGENCY (EMERGENCY)
Facility: HOSPITAL | Age: 32
LOS: 0 days | Discharge: HOME | End: 2022-01-14
Attending: EMERGENCY MEDICINE
Payer: COMMERCIAL

## 2022-01-14 ENCOUNTER — APPOINTMENT (OUTPATIENT)
Dept: INTERNAL MEDICINE | Facility: CLINIC | Age: 32
End: 2022-01-14

## 2022-01-14 VITALS
HEART RATE: 125 BPM | OXYGEN SATURATION: 100 % | DIASTOLIC BLOOD PRESSURE: 89 MMHG | TEMPERATURE: 98 F | SYSTOLIC BLOOD PRESSURE: 147 MMHG | RESPIRATION RATE: 19 BRPM | HEIGHT: 66 IN | WEIGHT: 147.93 LBS

## 2022-01-14 DIAGNOSIS — R05.9 COUGH, UNSPECIFIED: ICD-10-CM

## 2022-01-14 DIAGNOSIS — R51.9 HEADACHE, UNSPECIFIED: ICD-10-CM

## 2022-01-14 DIAGNOSIS — J45.901 UNSPECIFIED ASTHMA WITH (ACUTE) EXACERBATION: ICD-10-CM

## 2022-01-14 DIAGNOSIS — Z98.890 OTHER SPECIFIED POSTPROCEDURAL STATES: Chronic | ICD-10-CM

## 2022-01-14 DIAGNOSIS — B34.2 CORONAVIRUS INFECTION, UNSPECIFIED: ICD-10-CM

## 2022-01-14 DIAGNOSIS — R06.02 SHORTNESS OF BREATH: ICD-10-CM

## 2022-01-14 DIAGNOSIS — Z86.011 PERSONAL HISTORY OF BENIGN NEOPLASM OF THE BRAIN: Chronic | ICD-10-CM

## 2022-01-14 PROCEDURE — 93005 ELECTROCARDIOGRAM TRACING: CPT

## 2022-01-14 PROCEDURE — 99284 EMERGENCY DEPT VISIT MOD MDM: CPT | Mod: 25

## 2022-01-14 PROCEDURE — 93010 ELECTROCARDIOGRAM REPORT: CPT

## 2022-01-14 PROCEDURE — 99441: CPT

## 2022-01-14 PROCEDURE — 99284 EMERGENCY DEPT VISIT MOD MDM: CPT

## 2022-01-14 RX ORDER — PREDNISONE 20 MG/1
20 TABLET ORAL
Qty: 20 | Refills: 0 | Status: COMPLETED | COMMUNITY
Start: 2021-12-15 | End: 2022-01-14

## 2022-01-14 RX ORDER — ACETAMINOPHEN 500 MG
1000 TABLET ORAL ONCE
Refills: 0 | Status: COMPLETED | OUTPATIENT
Start: 2022-01-14 | End: 2022-01-14

## 2022-01-14 RX ADMIN — Medication 60 MILLIGRAM(S): at 20:18

## 2022-01-14 RX ADMIN — Medication 1000 MILLIGRAM(S): at 20:18

## 2022-01-14 NOTE — ED STATDOCS - CLINICAL SUMMARY MEDICAL DECISION MAKING FREE TEXT BOX
Pt satting well on RA, no wheezing, no respiratory distress.  Tachycardic s/p home inhalers.  Steroids given.  Monoclonal antibody screen.

## 2022-01-14 NOTE — ED STATDOCS - NSICDXPASTSURGICALHX_GEN_ALL_CORE_FT
PAST SURGICAL HISTORY:  History of benign brain tumor s/p resection 2010    History of bronchoscopy     History of strabismus surgery

## 2022-01-14 NOTE — ED ADULT TRIAGE NOTE - NS ED TRIAGE EKG
Patient:   DELMAR ALONZO            MRN: [id number]            FIN: [id number]              Age:   52 years     Sex:  MALE     :  66   Associated Diagnoses:   None   Author:   CAMERON, ALEKSANDER     General Surgery  S: No acute events overnight. +gas and output in the ostomy bag. Tolerating clears.  O:  Vitals between:   02-SEP-2019 12:57:37   TO   03-SEP-2019 12:57:37                   LAST RESULT MINIMUM MAXIMUM  Temperature 36.6 36.6 36.8  Heart Rate 79 72 79  Respiratory Rate 14 14 14  NISBP           122 122 125  NIDBP           83 74 85  SpO2                    97 95 98  CLOTILDE 5 cc  Gen: NAD  Neuro: awake, alert, responding to questions appropriately  Resp: non-labored  Abd: Soft, CLOTILDE in place to bulb, ostomy with gas & some stools, surgical dressing in place, ab binder in place  Ext: warm, dry  Psych: calm , cooperative  Labs:  Labs between:  02-SEP-2019 12:57 to 03-SEP-2019 12:57  POC GLU:                 Latest Result  Latest Date  Minimum  Min Date  Maximum  Max Date                             (H) 140  03-SEP-2019 (H) 140  03-SEP-2019 (H) 132  02-SEP-2019                 A/P: A 52-year-old male that recently underwent a robotic assisted laparoscopic low anterior resection for a premalignant polyp on 2019 by Dr. Knox that unfortunately developed an anastomotic breakdown and leakage with associated rectal necrosis that is now  POD#10 s/p Exploratory laparotomy, Omentectomy, Takedown of prior colorectal anastomosis, Partial colectomy, Stapling of distal rectal stump, Creation of end colostomy, Abdominal  lavage (2019). POD #4 for abdominal washout and closure of fascial dehisence.  -Clinically doing okay  -advance to general diet  - dc tpn  -CLOTILDE to bulb  -OOB, IS, Ambulate  -DVT prophylaxis  Will discuss with Dr Abilio Lr, PGY4  -General Surgery   EKG completed

## 2022-01-14 NOTE — ED STATDOCS - OBJECTIVE STATEMENT
31 F PMH asthma p/w HA, cough, body aches, SOB x 3 days, in the setting of testing (+) COVID today. hx of asthma using home inhalers. follows with Dr. Jolly. called her doctor and told to come in for solumedrol and to ask about monoclonal antibodies.

## 2022-01-14 NOTE — ED STATDOCS - NSICDXPASTMEDICALHX_GEN_ALL_CORE_FT
PAST MEDICAL HISTORY:  Asthma     Bladder spasm     Eamon-Danlos syndrome, type 3     GERD (gastroesophageal reflux disease)     Hypothyroid     IgA deficiency

## 2022-01-14 NOTE — ED STATDOCS - PATIENT PORTAL LINK FT
You can access the FollowMyHealth Patient Portal offered by [facility] by registering at the following website: [email or web address]. By joining [employer]’s FollowMyHealth portal, you will also be able to view your health information using other applications (apps) compatible with our system.

## 2022-01-14 NOTE — ED STATDOCS - CARE PLAN
1 Principal Discharge DX:	Acute asthma exacerbation  Secondary Diagnosis:	2019 novel coronavirus disease (COVID-19)

## 2022-01-14 NOTE — ED STATDOCS - NSICDXFAMILYHX_GEN_ALL_CORE_FT
1.76 FAMILY HISTORY:  Father  Still living? Yes, Estimated age: Age Unknown  Family history of bladder cancer, Age at diagnosis: Age Unknown    Mother  Still living? Unknown  Family history of hypothyroidism, Age at diagnosis: Age Unknown

## 2022-01-17 ENCOUNTER — TRANSCRIPTION ENCOUNTER (OUTPATIENT)
Age: 32
End: 2022-01-17

## 2022-01-18 ENCOUNTER — APPOINTMENT (OUTPATIENT)
Dept: INTERNAL MEDICINE | Facility: CLINIC | Age: 32
End: 2022-01-18
Payer: COMMERCIAL

## 2022-01-18 DIAGNOSIS — U07.1 COVID-19: ICD-10-CM

## 2022-01-18 DIAGNOSIS — J98.01 ACUTE BRONCHOSPASM: ICD-10-CM

## 2022-01-18 DIAGNOSIS — R06.2 WHEEZING: ICD-10-CM

## 2022-01-18 DIAGNOSIS — J34.89 OTHER SPECIFIED DISORDERS OF NOSE AND NASAL SINUSES: ICD-10-CM

## 2022-01-18 PROCEDURE — 99441: CPT

## 2022-01-18 RX ORDER — FLUTICASONE PROPIONATE 50 UG/1
50 SPRAY, METERED NASAL DAILY
Qty: 1 | Refills: 2 | Status: ACTIVE | COMMUNITY
Start: 2018-10-18 | End: 1900-01-01

## 2022-01-21 ENCOUNTER — TRANSCRIPTION ENCOUNTER (OUTPATIENT)
Age: 32
End: 2022-01-21

## 2022-01-21 ENCOUNTER — NON-APPOINTMENT (OUTPATIENT)
Age: 32
End: 2022-01-21

## 2022-01-28 ENCOUNTER — RX RENEWAL (OUTPATIENT)
Age: 32
End: 2022-01-28

## 2022-02-02 ENCOUNTER — TRANSCRIPTION ENCOUNTER (OUTPATIENT)
Age: 32
End: 2022-02-02

## 2022-02-08 ENCOUNTER — TRANSCRIPTION ENCOUNTER (OUTPATIENT)
Age: 32
End: 2022-02-08

## 2022-02-12 ENCOUNTER — TRANSCRIPTION ENCOUNTER (OUTPATIENT)
Age: 32
End: 2022-02-12

## 2022-03-16 ENCOUNTER — RX RENEWAL (OUTPATIENT)
Age: 32
End: 2022-03-16

## 2022-04-08 ENCOUNTER — APPOINTMENT (OUTPATIENT)
Dept: INTERNAL MEDICINE | Facility: CLINIC | Age: 32
End: 2022-04-08

## 2022-04-13 ENCOUNTER — TRANSCRIPTION ENCOUNTER (OUTPATIENT)
Age: 32
End: 2022-04-13

## 2022-04-15 ENCOUNTER — TRANSCRIPTION ENCOUNTER (OUTPATIENT)
Age: 32
End: 2022-04-15

## 2022-05-26 ENCOUNTER — TRANSCRIPTION ENCOUNTER (OUTPATIENT)
Age: 32
End: 2022-05-26

## 2022-06-14 ENCOUNTER — TRANSCRIPTION ENCOUNTER (OUTPATIENT)
Age: 32
End: 2022-06-14

## 2022-06-14 ENCOUNTER — NON-APPOINTMENT (OUTPATIENT)
Age: 32
End: 2022-06-14

## 2022-06-15 ENCOUNTER — NON-APPOINTMENT (OUTPATIENT)
Age: 32
End: 2022-06-15

## 2022-06-15 ENCOUNTER — APPOINTMENT (OUTPATIENT)
Dept: INTERNAL MEDICINE | Facility: CLINIC | Age: 32
End: 2022-06-15
Payer: COMMERCIAL

## 2022-06-15 VITALS
DIASTOLIC BLOOD PRESSURE: 70 MMHG | BODY MASS INDEX: 29.41 KG/M2 | SYSTOLIC BLOOD PRESSURE: 114 MMHG | TEMPERATURE: 98.5 F | RESPIRATION RATE: 16 BRPM | WEIGHT: 166 LBS | OXYGEN SATURATION: 97 % | HEIGHT: 63 IN | HEART RATE: 117 BPM

## 2022-06-15 DIAGNOSIS — F90.9 ATTENTION-DEFICIT HYPERACTIVITY DISORDER, UNSPECIFIED TYPE: ICD-10-CM

## 2022-06-15 DIAGNOSIS — J06.9 ACUTE UPPER RESPIRATORY INFECTION, UNSPECIFIED: ICD-10-CM

## 2022-06-15 DIAGNOSIS — Q79.62 HYPERMOBILE EHLERS-DANLOS SYNDROME: ICD-10-CM

## 2022-06-15 PROCEDURE — 94010 BREATHING CAPACITY TEST: CPT

## 2022-06-15 PROCEDURE — 96372 THER/PROPH/DIAG INJ SC/IM: CPT

## 2022-06-15 PROCEDURE — 99214 OFFICE O/P EST MOD 30 MIN: CPT | Mod: 25

## 2022-06-15 RX ORDER — TRIAMCINOLONE ACETONIDE 80 MG/ML
80 INJECTION, SUSPENSION INTRA-ARTICULAR; INTRAMUSCULAR
Qty: 8 | Refills: 0 | Status: COMPLETED | OUTPATIENT
Start: 2022-06-15

## 2022-06-15 RX ADMIN — TRIAMCINOLONE ACETONIDE 0 MG/ML: 40 INJECTION, SUSPENSION INTRA-ARTICULAR; INTRAMUSCULAR at 00:00

## 2022-06-15 NOTE — PROCEDURE
[FreeTextEntry1] : Repeat heart rate 96.\par \par Spirometry today is within normal limits with an FEV1 of 3.07 or 100% predicted.  This was 3.45 in January 2022.

## 2022-06-15 NOTE — PHYSICAL EXAM
[No Acute Distress] : no acute distress [Normal Oropharynx] : normal oropharynx [Normal Appearance] : normal appearance [No Neck Mass] : no neck mass [Normal Rate/Rhythm] : normal rate/rhythm [Normal S1, S2] : normal s1, s2 [No Murmurs] : no murmurs [No Resp Distress] : no resp distress [Clear to Auscultation Bilaterally] : clear to auscultation bilaterally [No Abnormalities] : no abnormalities [Benign] : benign [Normal Gait] : normal gait [No Clubbing] : no clubbing [No Cyanosis] : no cyanosis [No Edema] : no edema [Normal Color/ Pigmentation] : normal color/ pigmentation [No Focal Deficits] : no focal deficits [Oriented x3] : oriented x3 [Normal Affect] : normal affect [TextBox_54] : HR 96 [TextBox_68] : Positive expiratory wheeze with forced expiratory maneuver

## 2022-06-15 NOTE — HISTORY OF PRESENT ILLNESS
[TextBox_4] : Is a 31-year-old female with a history of moderate persistent asthma who developed symptoms including cough, green sputum production, some wheezing, and shortness of breath which began 1 week ago.  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  The patient began prednisone 60 mg/day last Friday, took her last dose yesterday, then went down to 40 mg today.  She was also seen at urgent care on Saturday where she also received Decadron and tested negative for COVID and for flu.  She was also treated with oral doxycycline.  She is not having sore throat, runny nose, or aches.  She is having some tiredness.  She has not had any fever, chills, chest pain, or palpitations.\par \par For her asthma she is maintained on Wixela 200 strength, Spiriva Respimat 1.25 strength and oral montelukast.  He has been using either Combivent or DuoNeb's for rescue frequently in the last few days.

## 2022-06-15 NOTE — ASSESSMENT
[FreeTextEntry1] : #1  Likely viral URI with acute bronchitis and asthma exacerbation.  Will finish course of doxycycline.  She is given Kenalog 80 mg IM today.  Continue prednisone taper tomorrow:  40, 40, 30, 30, 30, 20, 20, 20, 10, 10, 10.  Continue maintenance Wixela 250 strength, Spiriva Respimat 1.25 strength, and p.o. Montelukast.  Continue either Combivent or DuoNeb as needed for rescue.  Take benzonatate 200 mg p.o. 3 times daily as needed cough.  Ensure hydration.

## 2022-06-20 ENCOUNTER — TRANSCRIPTION ENCOUNTER (OUTPATIENT)
Age: 32
End: 2022-06-20

## 2022-06-21 ENCOUNTER — TRANSCRIPTION ENCOUNTER (OUTPATIENT)
Age: 32
End: 2022-06-21

## 2022-07-19 ENCOUNTER — TRANSCRIPTION ENCOUNTER (OUTPATIENT)
Age: 32
End: 2022-07-19

## 2022-07-19 RX ORDER — DOXYCYCLINE 100 MG/1
100 CAPSULE ORAL TWICE DAILY
Qty: 14 | Refills: 0 | Status: DISCONTINUED | COMMUNITY
Start: 2019-12-20 | End: 2022-07-19

## 2022-08-05 ENCOUNTER — TRANSCRIPTION ENCOUNTER (OUTPATIENT)
Age: 32
End: 2022-08-05

## 2022-08-13 NOTE — PHYSICAL EXAM
[No Acute Distress] : no acute distress [Well Nourished] : well nourished [Well Developed] : well developed [Normal Outer Ear/Nose] : the outer ears and nose were normal in appearance [Normal Oropharynx] : the oropharynx was normal [Normal TMs] : both tympanic membranes were normal [Supple] : supple [No Respiratory Distress] : no respiratory distress  [No Accessory Muscle Use] : no accessory muscle use [Clear to Auscultation] : lungs were clear to auscultation bilaterally [Normal Rate] : normal rate  98.1 [Regular Rhythm] : with a regular rhythm [Normal S1, S2] : normal S1 and S2 [Normal Posterior Cervical Nodes] : no posterior cervical lymphadenopathy [Normal Anterior Cervical Nodes] : no anterior cervical lymphadenopathy [Coordination Grossly Intact] : coordination grossly intact [No Focal Deficits] : no focal deficits [Normal Gait] : normal gait [Normal Affect] : the affect was normal [Alert and Oriented x3] : oriented to person, place, and time [Normal Insight/Judgement] : insight and judgment were intact

## 2022-10-27 NOTE — PATIENT PROFILE ADULT. - NUTRITION PROFILE
Michel Xavier is a 20 year old male presenting to the walk-in clinic today for Dysuria. Denies discharge or bumps or lesions. Does verbalizes that he has two new sexual partners.        Swabs/Specimens collected during rooming process:    Urine       PPE worn during room process    Writer: N95, Face shield/eye protection, gown, gloves    Patient: Cloth face mask      Patient would like communication of their results via:         Cell Phone:   Telephone Information:   Mobile [phone]     Okay to leave a message containing results? Yes     nausea/vomiting greater or equal to 3 days prior to admit

## 2022-11-18 ENCOUNTER — TRANSCRIPTION ENCOUNTER (OUTPATIENT)
Age: 32
End: 2022-11-18

## 2022-11-22 ENCOUNTER — APPOINTMENT (OUTPATIENT)
Dept: INTERNAL MEDICINE | Facility: CLINIC | Age: 32
End: 2022-11-22

## 2022-11-22 ENCOUNTER — NON-APPOINTMENT (OUTPATIENT)
Age: 32
End: 2022-11-22

## 2022-11-22 VITALS
DIASTOLIC BLOOD PRESSURE: 72 MMHG | BODY MASS INDEX: 26.58 KG/M2 | OXYGEN SATURATION: 98 % | RESPIRATION RATE: 16 BRPM | HEIGHT: 63 IN | HEART RATE: 121 BPM | WEIGHT: 150 LBS | TEMPERATURE: 99.3 F | SYSTOLIC BLOOD PRESSURE: 122 MMHG

## 2022-11-22 DIAGNOSIS — J20.9 ACUTE BRONCHITIS, UNSPECIFIED: ICD-10-CM

## 2022-11-22 DIAGNOSIS — J45.909 UNSPECIFIED ASTHMA, UNCOMPLICATED: ICD-10-CM

## 2022-11-22 PROCEDURE — 94010 BREATHING CAPACITY TEST: CPT

## 2022-11-22 PROCEDURE — 99214 OFFICE O/P EST MOD 30 MIN: CPT | Mod: 25

## 2022-11-22 NOTE — HISTORY OF PRESENT ILLNESS
[TextBox_4] : This is a 31-year-old female with a history of asthma who comes in with a 1-1/2-week history of increasing symptoms.  She currently is having coughing with green sputum production and noting more shortness of breath.  She is not having fever.  She is not having body aches, sore throat, or tiredness.  She did take prednisone 60 mg this morning on her own.  For her asthma she is maintained on montelukast, Spiriva Respimat 1.5, and Wixela 250 strength.  She has been using either DuoNeb or Combivent inhaler as needed for rescue.

## 2022-11-22 NOTE — ASSESSMENT
[FreeTextEntry1] : #1  Recent asthma exacerbation with acute bronchitis.  Patient will take a prednisone taper, 60, 60, 60, 40, 40, 40, 20, 20, 20.  Continue montelukast, Spiriva Respimat 1.25 strength, and Wixela 250 strength for maintenance.  Patient will continue to use either DuoNeb or Combivent inhaler every 6 hours as needed for rescue.  She will call or return if her symptoms are not improving/resolving.

## 2022-11-22 NOTE — PROCEDURE
[FreeTextEntry1] : Spirometry today is within normal limits with FEV1 of 3.20 or 104% predicted.\par \par Repeat heart rate 96 and regular.

## 2022-11-22 NOTE — PHYSICAL EXAM
[No Acute Distress] : no acute distress [Normal Oropharynx] : normal oropharynx [Normal Appearance] : normal appearance [No Neck Mass] : no neck mass [Normal Rate/Rhythm] : normal rate/rhythm [Normal S1, S2] : normal s1, s2 [No Murmurs] : no murmurs [No Resp Distress] : no resp distress [Clear to Auscultation Bilaterally] : clear to auscultation bilaterally [No Abnormalities] : no abnormalities [Benign] : benign [Normal Gait] : normal gait [No Clubbing] : no clubbing [No Cyanosis] : no cyanosis [No Edema] : no edema [Normal Color/ Pigmentation] : normal color/ pigmentation [No Focal Deficits] : no focal deficits [Oriented x3] : oriented x3 [Normal Affect] : normal affect [TextBox_68] : Wheeze with forced expiratory maneuver.

## 2023-02-24 ENCOUNTER — RX RENEWAL (OUTPATIENT)
Age: 33
End: 2023-02-24

## 2023-03-13 ENCOUNTER — RX RENEWAL (OUTPATIENT)
Age: 33
End: 2023-03-13

## 2023-03-23 ENCOUNTER — RX RENEWAL (OUTPATIENT)
Age: 33
End: 2023-03-23

## 2023-03-30 ENCOUNTER — APPOINTMENT (OUTPATIENT)
Dept: INTERNAL MEDICINE | Facility: CLINIC | Age: 33
End: 2023-03-30
Payer: COMMERCIAL

## 2023-03-30 VITALS
HEIGHT: 63 IN | RESPIRATION RATE: 16 BRPM | SYSTOLIC BLOOD PRESSURE: 118 MMHG | TEMPERATURE: 98.8 F | DIASTOLIC BLOOD PRESSURE: 78 MMHG | OXYGEN SATURATION: 99 % | WEIGHT: 145 LBS | BODY MASS INDEX: 25.69 KG/M2 | HEART RATE: 109 BPM

## 2023-03-30 DIAGNOSIS — J30.1 ALLERGIC RHINITIS DUE TO POLLEN: ICD-10-CM

## 2023-03-30 PROCEDURE — 99213 OFFICE O/P EST LOW 20 MIN: CPT

## 2023-03-30 RX ORDER — SUCRALFATE 1 G/10ML
1 SUSPENSION ORAL
Qty: 250 | Refills: 0 | Status: DISCONTINUED | COMMUNITY
Start: 2017-03-24 | End: 2023-03-30

## 2023-03-30 RX ORDER — BUDESONIDE 0.5 MG/2ML
0.5 INHALANT ORAL TWICE DAILY
Qty: 1 | Refills: 11 | Status: ACTIVE | COMMUNITY
Start: 2017-06-07 | End: 1900-01-01

## 2023-03-30 RX ORDER — IPRATROPIUM BROMIDE AND ALBUTEROL SULFATE 2.5; .5 MG/3ML; MG/3ML
0.5-2.5 (3) SOLUTION RESPIRATORY (INHALATION)
Qty: 1 | Refills: 0 | Status: ACTIVE | COMMUNITY
Start: 2019-12-20 | End: 1900-01-01

## 2023-03-30 RX ORDER — OMEPRAZOLE 40 MG/1
40 CAPSULE, DELAYED RELEASE ORAL
Qty: 90 | Refills: 3 | Status: DISCONTINUED | COMMUNITY
Start: 2019-07-02 | End: 2023-03-30

## 2023-03-30 NOTE — REVIEW OF SYSTEMS
[Anxiety] : anxiety [Easy Bruising] : no easy bruising [Swollen Glands] : no swollen glands [Negative] : Neurological

## 2023-03-30 NOTE — HISTORY OF PRESENT ILLNESS
[FreeTextEntry1] : F/U, Med Refill [de-identified] : 32F with PMHx of Asthma, ADHD, & Hypothyroidism presents to office for follow up and medication refill. She reports feeling well. She was seen by an allergist/immunologist, Dr. Elias Trevino and was started on Tezspire injection q 4 weeks. First dose 1-27-23, last dose 3/24/23, she reports significant improvement in her symptoms, she denies wheezing, cough, or shortness of breath. She is bothered sometimes by the weather (humidity & dry heat) and uses her rescue inhaler sporadically. She continues on Singulair, Wixela, Spiriva, and Combivent.

## 2023-03-30 NOTE — PLAN
[FreeTextEntry1] : 1. Refills given on all asthma medications.\par \par 2. Rx given for Doxycycline 100mg PO BID x 10 days and Prednisone taper to use if she gets bronchitis while on her [place] in [place] next [place]. \par \par 3. Follow up with Dr. Jolly in October for Full PFT. \par \par

## 2023-03-30 NOTE — PHYSICAL EXAM
[No Acute Distress] : no acute distress [Normal Sclera/Conjunctiva] : normal sclera/conjunctiva [Normal Outer Ear/Nose] : the outer ears and nose were normal in appearance [Normal Oropharynx] : the oropharynx was normal [No JVD] : no jugular venous distention [No Lymphadenopathy] : no lymphadenopathy [Supple] : supple [No Respiratory Distress] : no respiratory distress  [No Accessory Muscle Use] : no accessory muscle use [Clear to Auscultation] : lungs were clear to auscultation bilaterally [Normal Rate] : normal rate  [Regular Rhythm] : with a regular rhythm [Normal S1, S2] : normal S1 and S2 [No Edema] : there was no peripheral edema [No Extremity Clubbing/Cyanosis] : no extremity clubbing/cyanosis [Soft] : abdomen soft [Non Tender] : non-tender [Non-distended] : non-distended [No Masses] : no abdominal mass palpated [Normal Bowel Sounds] : normal bowel sounds [Normal Anterior Cervical Nodes] : no anterior cervical lymphadenopathy [No Joint Swelling] : no joint swelling [Grossly Normal Strength/Tone] : grossly normal strength/tone [No Rash] : no rash [No Focal Deficits] : no focal deficits [Normal Gait] : normal gait [Normal Affect] : the affect was normal [Alert and Oriented x3] : oriented to person, place, and time [Normal Insight/Judgement] : insight and judgment were intact

## 2023-04-13 NOTE — HISTORY OF PRESENT ILLNESS
----- Message from Tone Healy MD sent at 4/13/2023 10:50 AM CDT -----  Repeat CBC in 6 weeks    
[FreeTextEntry8] : Pt is A 30 yr. old female with a h/ of GERD, migraines, moderate persistent asthma. 
[FreeTextEntry8] : Pt is A 30 yr. old female with a h/ of GERD, migraines, moderate persistent asthma.

## 2023-06-02 ENCOUNTER — RX RENEWAL (OUTPATIENT)
Age: 33
End: 2023-06-02

## 2023-06-02 ENCOUNTER — TRANSCRIPTION ENCOUNTER (OUTPATIENT)
Age: 33
End: 2023-06-02

## 2023-06-06 ENCOUNTER — RX RENEWAL (OUTPATIENT)
Age: 33
End: 2023-06-06

## 2023-06-26 ENCOUNTER — TRANSCRIPTION ENCOUNTER (OUTPATIENT)
Age: 33
End: 2023-06-26

## 2023-07-03 ENCOUNTER — RX RENEWAL (OUTPATIENT)
Age: 33
End: 2023-07-03

## 2023-07-26 NOTE — ED ADULT NURSE NOTE - CCCP TRG CHIEF CMPLNT
Assessment/Plan:    No problem-specific Assessment & Plan notes found for this encounter. Diagnoses and all orders for this visit:    Follow-up exam        - Benign exam  - Back to baseline  - Possibly consumed her snack too quickly yesterday at   - Cleared to return to      Subjective:     History provided by: mother     Patient ID: Kamlesh Velez is a 25 m.o. female. 18 month old female who presents for evaluation. She was sent home from  yesterday after episode of vomiting. It has not recurred. She is at her baseline without fever, cough, rash, diarrhea or appetite change. No pain with urination or cloudy urine. The following portions of the patient's history were reviewed and updated as appropriate: allergies, current medications, past family history, past medical history, past social history, past surgical history and problem list.    Review of Systems   Constitutional: Negative for activity change, appetite change, chills and fever. HENT: Negative for ear pain, mouth sores and sore throat. Eyes: Negative for pain and redness. Respiratory: Negative for cough and wheezing. Cardiovascular: Negative for chest pain and leg swelling. Gastrointestinal: Negative for abdominal pain and diarrhea. Vomiting x 1 at  yesterday, resolved   Genitourinary: Negative for dysuria and frequency. Musculoskeletal: Negative for gait problem and joint swelling. Skin: Negative for color change and rash. Psychiatric/Behavioral: Negative for sleep disturbance. All other systems reviewed and are negative. Objective:      Temp 97.9 °F (36.6 °C)   Wt 11.4 kg (25 lb 3.2 oz)          Physical Exam  Vitals and nursing note reviewed. Constitutional:       General: She is active. She is not in acute distress. Appearance: She is well-developed.    HENT:      Right Ear: Tympanic membrane and external ear normal.      Left Ear: Tympanic membrane and external ear normal.      Nose: Nose normal.      Mouth/Throat:      Mouth: Mucous membranes are moist.      Pharynx: Oropharynx is clear. Eyes:      Conjunctiva/sclera: Conjunctivae normal.      Pupils: Pupils are equal, round, and reactive to light. Cardiovascular:      Rate and Rhythm: Normal rate and regular rhythm. Heart sounds: S1 normal and S2 normal. No murmur heard. Pulmonary:      Effort: Pulmonary effort is normal. No respiratory distress. Breath sounds: Normal breath sounds. No wheezing, rhonchi or rales. Abdominal:      General: Bowel sounds are normal. There is no distension. Palpations: Abdomen is soft. There is no mass. Musculoskeletal:         General: No deformity. Normal range of motion. Cervical back: Normal range of motion and neck supple. Skin:     General: Skin is warm. Neurological:      Mental Status: She is alert. asthma exacerbation

## 2023-08-04 ENCOUNTER — RX RENEWAL (OUTPATIENT)
Age: 33
End: 2023-08-04

## 2023-08-04 RX ORDER — FLUTICASONE PROPIONATE AND SALMETEROL 250; 50 UG/1; UG/1
250-50 POWDER RESPIRATORY (INHALATION)
Qty: 3 | Refills: 3 | Status: ACTIVE | COMMUNITY
Start: 2017-08-31 | End: 1900-01-01

## 2023-08-10 ENCOUNTER — TRANSCRIPTION ENCOUNTER (OUTPATIENT)
Age: 33
End: 2023-08-10

## 2023-08-21 RX ORDER — TEZEPELUMAB-EKKO 210 MG/1.9ML
210 INJECTION, SOLUTION SUBCUTANEOUS
Refills: 0 | Status: ACTIVE | COMMUNITY
Start: 2023-08-21

## 2023-09-10 ENCOUNTER — EMERGENCY (EMERGENCY)
Facility: HOSPITAL | Age: 33
LOS: 0 days | Discharge: HOME | End: 2023-09-10
Attending: STUDENT IN AN ORGANIZED HEALTH CARE EDUCATION/TRAINING PROGRAM
Payer: COMMERCIAL

## 2023-09-10 VITALS
RESPIRATION RATE: 17 BRPM | DIASTOLIC BLOOD PRESSURE: 67 MMHG | OXYGEN SATURATION: 100 % | HEART RATE: 100 BPM | SYSTOLIC BLOOD PRESSURE: 114 MMHG

## 2023-09-10 VITALS — HEIGHT: 68 IN | WEIGHT: 143.96 LBS

## 2023-09-10 DIAGNOSIS — L03.113 CELLULITIS OF RIGHT UPPER LIMB: ICD-10-CM

## 2023-09-10 DIAGNOSIS — Z86.011 PERSONAL HISTORY OF BENIGN NEOPLASM OF THE BRAIN: Chronic | ICD-10-CM

## 2023-09-10 DIAGNOSIS — G44.209 TENSION-TYPE HEADACHE, UNSPECIFIED, NOT INTRACTABLE: ICD-10-CM

## 2023-09-10 DIAGNOSIS — Q79.60 EHLERS-DANLOS SYNDROME, UNSPECIFIED: ICD-10-CM

## 2023-09-10 DIAGNOSIS — E03.9 HYPOTHYROIDISM, UNSPECIFIED: ICD-10-CM

## 2023-09-10 DIAGNOSIS — Z88.1 ALLERGY STATUS TO OTHER ANTIBIOTIC AGENTS STATUS: ICD-10-CM

## 2023-09-10 DIAGNOSIS — Z98.890 OTHER SPECIFIED POSTPROCEDURAL STATES: Chronic | ICD-10-CM

## 2023-09-10 DIAGNOSIS — Z88.8 ALLERGY STATUS TO OTHER DRUGS, MEDICAMENTS AND BIOLOGICAL SUBSTANCES: ICD-10-CM

## 2023-09-10 DIAGNOSIS — R50.9 FEVER, UNSPECIFIED: ICD-10-CM

## 2023-09-10 DIAGNOSIS — K21.9 GASTRO-ESOPHAGEAL REFLUX DISEASE WITHOUT ESOPHAGITIS: ICD-10-CM

## 2023-09-10 DIAGNOSIS — R51.9 HEADACHE, UNSPECIFIED: ICD-10-CM

## 2023-09-10 DIAGNOSIS — D72.829 ELEVATED WHITE BLOOD CELL COUNT, UNSPECIFIED: ICD-10-CM

## 2023-09-10 DIAGNOSIS — J45.909 UNSPECIFIED ASTHMA, UNCOMPLICATED: ICD-10-CM

## 2023-09-10 DIAGNOSIS — Z20.822 CONTACT WITH AND (SUSPECTED) EXPOSURE TO COVID-19: ICD-10-CM

## 2023-09-10 DIAGNOSIS — M79.10 MYALGIA, UNSPECIFIED SITE: ICD-10-CM

## 2023-09-10 LAB
ALBUMIN SERPL ELPH-MCNC: 3.8 G/DL — SIGNIFICANT CHANGE UP (ref 3.3–5)
ALP SERPL-CCNC: 68 U/L — SIGNIFICANT CHANGE UP (ref 40–120)
ALT FLD-CCNC: 27 U/L — SIGNIFICANT CHANGE UP (ref 12–78)
ANION GAP SERPL CALC-SCNC: 8 MMOL/L — SIGNIFICANT CHANGE UP (ref 5–17)
APPEARANCE UR: CLEAR — SIGNIFICANT CHANGE UP
AST SERPL-CCNC: 21 U/L — SIGNIFICANT CHANGE UP (ref 15–37)
BACTERIA # UR AUTO: ABNORMAL
BASOPHILS # BLD AUTO: 0.03 K/UL — SIGNIFICANT CHANGE UP (ref 0–0.2)
BASOPHILS NFR BLD AUTO: 0.2 % — SIGNIFICANT CHANGE UP (ref 0–2)
BILIRUB SERPL-MCNC: 0.7 MG/DL — SIGNIFICANT CHANGE UP (ref 0.2–1.2)
BILIRUB UR-MCNC: NEGATIVE — SIGNIFICANT CHANGE UP
BUN SERPL-MCNC: 7 MG/DL — SIGNIFICANT CHANGE UP (ref 7–23)
CALCIUM SERPL-MCNC: 9.5 MG/DL — SIGNIFICANT CHANGE UP (ref 8.5–10.1)
CHLORIDE SERPL-SCNC: 107 MMOL/L — SIGNIFICANT CHANGE UP (ref 96–108)
CO2 SERPL-SCNC: 23 MMOL/L — SIGNIFICANT CHANGE UP (ref 22–31)
COLOR SPEC: YELLOW — SIGNIFICANT CHANGE UP
COMMENT - URINE: SIGNIFICANT CHANGE UP
CREAT SERPL-MCNC: 0.81 MG/DL — SIGNIFICANT CHANGE UP (ref 0.5–1.3)
DIFF PNL FLD: ABNORMAL
EGFR: 99 ML/MIN/1.73M2 — SIGNIFICANT CHANGE UP
EOSINOPHIL # BLD AUTO: 0.01 K/UL — SIGNIFICANT CHANGE UP (ref 0–0.5)
EOSINOPHIL NFR BLD AUTO: 0.1 % — SIGNIFICANT CHANGE UP (ref 0–6)
EPI CELLS # UR: ABNORMAL
GLUCOSE SERPL-MCNC: 91 MG/DL — SIGNIFICANT CHANGE UP (ref 70–99)
GLUCOSE UR QL: NEGATIVE — SIGNIFICANT CHANGE UP
HCG SERPL-ACNC: <1 MIU/ML — SIGNIFICANT CHANGE UP
HCT VFR BLD CALC: 41 % — SIGNIFICANT CHANGE UP (ref 34.5–45)
HGB BLD-MCNC: 13.9 G/DL — SIGNIFICANT CHANGE UP (ref 11.5–15.5)
IMM GRANULOCYTES NFR BLD AUTO: 0.3 % — SIGNIFICANT CHANGE UP (ref 0–0.9)
KETONES UR-MCNC: ABNORMAL
LACTATE SERPL-SCNC: 1.1 MMOL/L — SIGNIFICANT CHANGE UP (ref 0.7–2)
LEUKOCYTE ESTERASE UR-ACNC: NEGATIVE — SIGNIFICANT CHANGE UP
LYMPHOCYTES # BLD AUTO: 0.93 K/UL — LOW (ref 1–3.3)
LYMPHOCYTES # BLD AUTO: 6.4 % — LOW (ref 13–44)
MCHC RBC-ENTMCNC: 31.4 PG — SIGNIFICANT CHANGE UP (ref 27–34)
MCHC RBC-ENTMCNC: 33.9 GM/DL — SIGNIFICANT CHANGE UP (ref 32–36)
MCV RBC AUTO: 92.6 FL — SIGNIFICANT CHANGE UP (ref 80–100)
MONOCYTES # BLD AUTO: 0.78 K/UL — SIGNIFICANT CHANGE UP (ref 0–0.9)
MONOCYTES NFR BLD AUTO: 5.4 % — SIGNIFICANT CHANGE UP (ref 2–14)
NEUTROPHILS # BLD AUTO: 12.75 K/UL — HIGH (ref 1.8–7.4)
NEUTROPHILS NFR BLD AUTO: 87.6 % — HIGH (ref 43–77)
NITRITE UR-MCNC: NEGATIVE — SIGNIFICANT CHANGE UP
PH UR: 7 — SIGNIFICANT CHANGE UP (ref 5–8)
PLATELET # BLD AUTO: 373 K/UL — SIGNIFICANT CHANGE UP (ref 150–400)
POTASSIUM SERPL-MCNC: 3.7 MMOL/L — SIGNIFICANT CHANGE UP (ref 3.5–5.3)
POTASSIUM SERPL-SCNC: 3.7 MMOL/L — SIGNIFICANT CHANGE UP (ref 3.5–5.3)
PROT SERPL-MCNC: 8.1 GM/DL — SIGNIFICANT CHANGE UP (ref 6–8.3)
PROT UR-MCNC: SIGNIFICANT CHANGE UP MG/DL
RAPID RVP RESULT: SIGNIFICANT CHANGE UP
RBC # BLD: 4.43 M/UL — SIGNIFICANT CHANGE UP (ref 3.8–5.2)
RBC # FLD: 12.3 % — SIGNIFICANT CHANGE UP (ref 10.3–14.5)
RBC CASTS # UR COMP ASSIST: ABNORMAL /HPF (ref 0–4)
SARS-COV-2 RNA SPEC QL NAA+PROBE: SIGNIFICANT CHANGE UP
SODIUM SERPL-SCNC: 138 MMOL/L — SIGNIFICANT CHANGE UP (ref 135–145)
SP GR SPEC: 1 — LOW (ref 1.01–1.02)
TSH SERPL-MCNC: 0.94 UU/ML — SIGNIFICANT CHANGE UP (ref 0.34–4.82)
UROBILINOGEN FLD QL: NEGATIVE — SIGNIFICANT CHANGE UP
WBC # BLD: 14.55 K/UL — HIGH (ref 3.8–10.5)
WBC # FLD AUTO: 14.55 K/UL — HIGH (ref 3.8–10.5)
WBC UR QL: SIGNIFICANT CHANGE UP /HPF (ref 0–5)

## 2023-09-10 PROCEDURE — 71046 X-RAY EXAM CHEST 2 VIEWS: CPT

## 2023-09-10 PROCEDURE — 85025 COMPLETE CBC W/AUTO DIFF WBC: CPT

## 2023-09-10 PROCEDURE — 84702 CHORIONIC GONADOTROPIN TEST: CPT

## 2023-09-10 PROCEDURE — 96374 THER/PROPH/DIAG INJ IV PUSH: CPT

## 2023-09-10 PROCEDURE — 96361 HYDRATE IV INFUSION ADD-ON: CPT

## 2023-09-10 PROCEDURE — 71046 X-RAY EXAM CHEST 2 VIEWS: CPT | Mod: 26

## 2023-09-10 PROCEDURE — 36415 COLL VENOUS BLD VENIPUNCTURE: CPT

## 2023-09-10 PROCEDURE — 80053 COMPREHEN METABOLIC PANEL: CPT

## 2023-09-10 PROCEDURE — 0225U NFCT DS DNA&RNA 21 SARSCOV2: CPT

## 2023-09-10 PROCEDURE — 83605 ASSAY OF LACTIC ACID: CPT

## 2023-09-10 PROCEDURE — 70450 CT HEAD/BRAIN W/O DYE: CPT | Mod: MA

## 2023-09-10 PROCEDURE — 99284 EMERGENCY DEPT VISIT MOD MDM: CPT

## 2023-09-10 PROCEDURE — 84443 ASSAY THYROID STIM HORMONE: CPT

## 2023-09-10 PROCEDURE — 70450 CT HEAD/BRAIN W/O DYE: CPT | Mod: 26,MA

## 2023-09-10 PROCEDURE — 99284 EMERGENCY DEPT VISIT MOD MDM: CPT | Mod: 25

## 2023-09-10 PROCEDURE — 96375 TX/PRO/DX INJ NEW DRUG ADDON: CPT

## 2023-09-10 PROCEDURE — 87086 URINE CULTURE/COLONY COUNT: CPT

## 2023-09-10 PROCEDURE — 87040 BLOOD CULTURE FOR BACTERIA: CPT

## 2023-09-10 PROCEDURE — 81001 URINALYSIS AUTO W/SCOPE: CPT

## 2023-09-10 RX ORDER — DIPHENHYDRAMINE HCL 50 MG
25 CAPSULE ORAL ONCE
Refills: 0 | Status: COMPLETED | OUTPATIENT
Start: 2023-09-10 | End: 2023-09-10

## 2023-09-10 RX ORDER — METOCLOPRAMIDE HCL 10 MG
10 TABLET ORAL ONCE
Refills: 0 | Status: COMPLETED | OUTPATIENT
Start: 2023-09-10 | End: 2023-09-10

## 2023-09-10 RX ORDER — ACETAMINOPHEN 500 MG
650 TABLET ORAL ONCE
Refills: 0 | Status: COMPLETED | OUTPATIENT
Start: 2023-09-10 | End: 2023-09-10

## 2023-09-10 RX ORDER — SODIUM CHLORIDE 9 MG/ML
1000 INJECTION INTRAMUSCULAR; INTRAVENOUS; SUBCUTANEOUS ONCE
Refills: 0 | Status: COMPLETED | OUTPATIENT
Start: 2023-09-10 | End: 2023-09-10

## 2023-09-10 RX ORDER — KETOROLAC TROMETHAMINE 30 MG/ML
15 SYRINGE (ML) INJECTION ONCE
Refills: 0 | Status: DISCONTINUED | OUTPATIENT
Start: 2023-09-10 | End: 2023-09-10

## 2023-09-10 RX ADMIN — SODIUM CHLORIDE 1000 MILLILITER(S): 9 INJECTION INTRAMUSCULAR; INTRAVENOUS; SUBCUTANEOUS at 11:52

## 2023-09-10 RX ADMIN — Medication 10 MILLIGRAM(S): at 11:52

## 2023-09-10 RX ADMIN — Medication 25 MILLIGRAM(S): at 11:51

## 2023-09-10 RX ADMIN — SODIUM CHLORIDE 1000 MILLILITER(S): 9 INJECTION INTRAMUSCULAR; INTRAVENOUS; SUBCUTANEOUS at 13:00

## 2023-09-10 RX ADMIN — Medication 15 MILLIGRAM(S): at 13:57

## 2023-09-10 RX ADMIN — Medication 650 MILLIGRAM(S): at 11:52

## 2023-09-10 RX ADMIN — Medication 650 MILLIGRAM(S): at 13:43

## 2023-09-10 NOTE — ED PROVIDER NOTE - NSICDXFAMILYHX_GEN_ALL_CORE_FT
FAMILY HISTORY:  Father  Still living? Yes, Estimated age: Age Unknown  Family history of bladder cancer, Age at diagnosis: Age Unknown    Mother  Still living? Unknown  Family history of hypothyroidism, Age at diagnosis: Age Unknown

## 2023-09-10 NOTE — ED PROVIDER NOTE - PATIENT PORTAL LINK FT
You can access the FollowMyHealth Patient Portal offered by [facility] by registering at the following website: [email or web address]. By joining [facility]’s FollowMyHealth portal, you will also be able to view your health information using other applications (apps) compatible with our system.

## 2023-09-10 NOTE — ED ADULT NURSE NOTE - NSFALLRISKASMT_ED_ALL_ED_DT
Protocol For Photochemotherapy: Petrolatum And Broad Band Uvb: The patient received Photochemotherapy: Petrolatum and Broad Band UVB. Protocol For Photochemotherapy For Severe Photoresponsive Dermatoses: Puva: The patient received Photochemotherapy for severe photoresponsive dermatoses: PUVA requiring at least 4 to 8 hours of care under direct physician supervision. Consent: Written consent obtained.  The risks were reviewed with the patient including but not limited to: burn, pigmentary changes, pain, blistering, scabbing, redness, increased risk of skin cancers, and the remote possibility of scarring. Protocol For Photochemotherapy: Mineral Oil And Nbuvb: The patient received Photochemotherapy: Mineral Oil and NBUVB (mineral oil applied to all lesions prior to phototherapy). Protocol For Photochemotherapy: Petrolatum And Nbuvb: The patient received Photochemotherapy: Petrolatum and NBUVB (petrolatum applied to all lesions prior to phototherapy). Protocol For Nbuvb: The patient received NBUVB. Protocol For Broad Band Uvb: The patient received Broad Band UVB. Protocol For Protocol For Photochemotherapy For Severe Photoresponsive Dermatoses: Bath Puva: The patient received Photochemotherapy for severe photoresponsive dermatoses: Bath PUVA requiring at least 4 to 8 hours of care under direct physician supervision. Protocol For Photochemotherapy For Severe Photoresponsive Dermatoses: Tar And Broad Band Uvb (Goeckerman Treatment): The patient received Photochemotherapy for severe photoresponsive dermatoses: Tar and Broad Band UVB (Goeckerman treatment) requiring at least 4 to 8 hours of care under direct physician supervision. Protocol For Puva: The patient received PUVA. Protocol For Photochemotherapy: Baby Oil And Nbuvb: The patient received Photochemotherapy: Baby Oil and NBUVB (baby oil applied to all lesions prior to phototherapy). Protocol For Photochemotherapy: Triamcinolone Ointment And Nbuvb: The patient received Photochemotherapy: Triamcinolone and NBUVB (triamcinolone ointment applied to all lesions prior to phototherapy). Protocol For Photochemotherapy: Tar And Broad Band Uvb (Goeckerman Treatment): The patient received Photochemotherapy: Tar and Broad Band UVB (Goeckerman treatment). Protocol For Uva: The patient received UVA. Protocol For Nb Uva: The patient received NB UVA. Protocol For Bath Puva: The patient received Bath PUVA. Protocol For Uva1: The patient received UVA1. Protocol: Broad Band UVB Post-Care Instructions: I reviewed with the patient in detail post-care instructions. Patient is to wear sun protection. Patients may expect sunburn like redness, discomfort and scabbing. Protocol For Photochemotherapy: Mineral Oil And Broad Band Uvb: The patient received Photochemotherapy: Mineral Oil and Broad Band UVB. Treatment Number: 93 Total Treatment Time: 6:30 10-Sep-2023 12:15 Protocol For Photochemotherapy: Tar And Nbuvb (Goeckerman Treatment): The patient received Photochemotherapy: Tar and NBUVB (Goeckerman treatment). Protocol For Photochemotherapy For Severe Photoresponsive Dermatoses: Petrolatum And Nbuvb: The patient received Photochemotherapy for severe photoresponsive dermatoses: Petrolatum and NBUVB requiring at least 4 to 8 hours of care under direct physician supervision. Detail Level: Generalized Protocol For Photochemotherapy For Severe Photoresponsive Dermatoses: Petrolatum And Broad Band Uvb: The patient received Photochemotherapyfor severe photoresponsive dermatoses: Petrolatum and Broad Band UVB requiring at least 4 to 8 hours of care under direct physician supervision. Protocol For Photochemotherapy For Severe Photoresponsive Dermatoses: Tar And Nbuvb (Goeckerman Treatment): The patient received Photochemotherapy for severe photoresponsive dermatoses: Tar and NBUVB (Goeckerman treatment) requiring at least 4 to 8 hours of care under direct physician supervision.

## 2023-09-10 NOTE — ED ADULT TRIAGE NOTE - CHIEF COMPLAINT QUOTE
Pt presents to ED with c/o of multiple medical complaints, PT reports fevers, HA, body aches x 2days, Max temp 102, last time Tylenol taken 12AM, Denies chest pain, SOB, N/V, sick contacts, 9/10 pain

## 2023-09-10 NOTE — ED PROVIDER NOTE - NS ED ROS FT
General: No fever, no nausea, no vomiting +HA  HEENT: No loc, no ha, no dizziness  Respiratory: no trouble breathing  Cardiovascular: No chest pain  GI: No abdominal pain, no diarrhea  : No urinary complaints  Endocrine: no generalized weakness  Neurological: No weakness, numbness +fever  MSK: no recent trauma +body aches

## 2023-09-10 NOTE — ED PROVIDER NOTE - OBJECTIVE STATEMENT
33 y/o female w/PMHx of asthma, GERD, hypothyroid, Eamon-Danlos syndrome type 3, IgA deficiency, ganglioneurocytoma (2010), and strabismus b/l presents to the ED c/o fever, HA, and body aches x1 days. Pt reports T-max 102, took Tylenol at 12AM. Pt endorsing severe HA starting yesterday. States the headache began yesterday and fever started last night. Denies cough, runny nose, CP, nausea, vomiting, dysuria, blurry vision, nasal congestion, or abd pain. no +sick contacts. no throat or ear pain. no hx of migraines. Pt states Tylenol not providing any relief. Advil providing some relief. Pt states she got a pneumococcal vaccine on Friday IM.  neurosurgeon - Dr Cortez Prabhakar, [facility] 33 y/o female w/PMHx of asthma, GERD, hypothyroid, Eamon-Danlos syndrome type 3, IgA deficiency, ganglioneurocytoma (2010), and strabismus b/l presents to the ED c/o fever, HA, and body aches x1 days. Pt reports T-max 102, took Tylenol at 12AM. Pt endorsing severe HA starting yesterday. States the headache began yesterday and fever started last night. Denies cough, runny nose, CP, nausea, vomiting, dysuria, blurry vision, nasal congestion, or abd pain. no +sick contacts. no throat or ear pain. no hx of migraines. Pt states Tylenol not providing any relief. Advil providing some relief. Pt states she got a pneumococcal vaccine on Friday IM. Pt allergic to Keflex  neurosurgeon - Dr Cortez Prabhakar, [facility] 31 y/o female w/PMHx of asthma, GERD, hypothyroid, Eamon-Danlos syndrome type 3, IgA deficiency, ganglioneurocytoma (2010), and strabismus b/l presents to the ED c/o fever, HA, and body aches x1 days. Pt reports T-max 102, took Tylenol at 12AM. Pt endorsing severe HA starting yesterday. States the headache began yesterday and fever started last night. Denies cough, runny nose, CP, nausea, vomiting, dysuria, blurry vision, nasal congestion, or abd pain. no +sick contacts. no throat or ear pain. no photophobia, no rash, no hx of migraines. Pt states Tylenol not providing any relief. Advil providing some relief. Pt states she got a pneumococcal vaccine on Friday IM. Pt allergic to Keflex  neurosurgeon - Dr Cortez Prabhakar, [facility]

## 2023-09-10 NOTE — ED PROVIDER NOTE - NSFOLLOWUPINSTRUCTIONS_ED_ALL_ED_FT
Patient advised to take meds as prescribed and follow up with PMD/ for follow up in 3-4 days.  patient and her  understands and agrees with plan.  Return to ER if develop. nausea, vomiting, change in mental status, worsening redness, symptoms worsens or develop new symptoms.       Acute Headache    WHAT YOU NEED TO KNOW:    An acute headache is pain or discomfort that starts suddenly and gets worse quickly. You may have an acute headache only when you feel stress or eat certain foods. Other acute headache pain can happen every day, and sometimes several times a day.     DISCHARGE INSTRUCTIONS:    Return to the emergency department if:     You have severe pain.      You have numbness or weakness on one side of your face or body.      You have a headache that occurs after a blow to the head, a fall, or other trauma.       You have a headache, are forgetful or confused, or have trouble speaking.      You have a headache, stiff neck, and a fever.    Contact your healthcare provider if:     You have a constant headache and are vomiting.      You have a headache each day that does not get better, even after treatment.      You have changes in your headaches, or new symptoms that occur when you have a headache.      You have questions or concerns about your condition or care.    Medicines: You may need any of the following:     Prescription pain medicine may be given. The medicine your healthcare provider recommends will depend on the kind of headaches you have. You will need to take prescription headache medicines as directed to prevent a problem called rebound headache. These headaches happen with regular use of pain relievers for headache disorders.      NSAIDs, such as ibuprofen, help decrease swelling, pain, and fever. This medicine is available with or without a doctor's order. NSAIDs can cause stomach bleeding or kidney problems in certain people. If you take blood thinner medicine, always ask your healthcare provider if NSAIDs are safe for you. Always read the medicine label and follow directions.      Acetaminophen decreases pain and fever. It is available without a doctor's order. Ask how much to take and how often to take it. Follow directions. Read the labels of all other medicines you are using to see if they also contain acetaminophen, or ask your doctor or pharmacist. Acetaminophen can cause liver damage if not taken correctly. Do not use more than 3 grams (3,000 milligrams) total of acetaminophen in one day.       Antidepressants may be given for some kinds of headaches.       Take your medicine as directed. Contact your healthcare provider if you think your medicine is not helping or if you have side effects. Tell him or her if you are allergic to any medicine. Keep a list of the medicines, vitamins, and herbs you take. Include the amounts, and when and why you take them. Bring the list or the pill bottles to follow-up visits. Carry your medicine list with you in case of an emergency.    Manage your symptoms:     Apply heat or ice on the headache area. Use a heat or ice pack. For an ice pack, you can also put crushed ice in a plastic bag. Cover the pack or bag with a towel before you apply it to your skin. Ice and heat both help decrease pain, and heat also helps decrease muscle spasms. Apply heat for 20 to 30 minutes every 2 hours. Apply ice for 15 to 20 minutes every hour. Apply heat or ice for as long and for as many days as directed. You may alternate heat and ice.      Relax your muscles. Lie down in a comfortable position and close your eyes. Relax your muscles slowly. Start at your toes and work your way up your body.      Keep a record of your headaches. Write down when your headaches start and stop. Include your symptoms and what you were doing when the headache began. Record what you ate or drank for 24 hours before the headache started. Describe the pain and where it hurts. Keep track of what you did to treat your headache and if it worked.     Prevent an acute headache:     Avoid anything that triggers an acute headache. Examples include exposure to chemicals, going to high altitude, or not getting enough sleep. Create a regular sleep routine. Go to sleep at the same time and wake up at the same time each day. Do not use electronic devices before bedtime. These may trigger a headache or prevent you from sleeping well.      Do not smoke. Nicotine and other chemicals in cigarettes and cigars can trigger an acute headache or make it worse. Ask your healthcare provider for information if you currently smoke and need help to quit. E-cigarettes or smokeless tobacco still contain nicotine. Talk to your healthcare provider before you use these products.       Limit alcohol as directed. Alcohol can trigger an acute headache or make it worse. If you have cluster headaches, do not drink alcohol during an episode. For other types of headaches, ask your healthcare provider if it is safe for you to drink alcohol. Ask how much is safe for you to drink, and how often.      Exercise as directed. Exercise can reduce tension and help with headache pain. Aim for 30 minutes of physical activity on most days of the week. Your healthcare provider can help you create an exercise plan.      Eat a variety of healthy foods. Healthy foods include fruits, vegetables, low-fat dairy products, lean meats, fish, whole grains, and cooked beans. Your healthcare provider or dietitian can help you create meals plans if you need to avoid foods that trigger headaches.    Follow up with your healthcare provider as directed: Bring your headache record with you when you see your healthcare provider. Write down your questions so you remember to ask them during your visits.       Cellulitis    WHAT YOU NEED TO KNOW:    Cellulitis is a skin infection caused by bacteria. Cellulitis may go away on its own or you may need treatment. Your healthcare provider may draw a [place] around the outside edges of your cellulitis. If your cellulitis spreads, your healthcare provider will see it outside of the [place]. Cellulitis          DISCHARGE INSTRUCTIONS:    Call 911 if:     You have sudden trouble breathing or chest pain.        Return to the emergency department if:     Your wound gets larger and more painful.       You feel a crackling under your skin when you touch it.      You have purple dots or bumps on your skin, or you see bleeding under your skin.      You have new swelling and pain in your legs.      The red, warm, swollen area gets larger.      You see red streaks coming from the infected area.    Contact your healthcare provider if:     You have a fever.      Your fever or pain does not go away or gets worse.      The area does not get smaller after 2 days of antibiotics.      Your skin is flaking or peeling off.      You have questions or concerns about your condition or care.    Medicines:     Antibiotics help treat the bacterial infection.       NSAIDs, such as ibuprofen, help decrease swelling, pain, and fever. NSAIDs can cause stomach bleeding or kidney problems in certain people. If you take blood thinner medicine, always ask if NSAIDs are safe for you. Always read the medicine label and follow directions. Do not give these medicines to children under 6 months of age without direction from your child's healthcare provider.      Acetaminophen decreases pain and fever. It is available without a doctor's order. Ask how much to take and how often to take it. Follow directions. Read the labels of all other medicines you are using to see if they also contain acetaminophen, or ask your doctor or pharmacist. Acetaminophen can cause liver damage if not taken correctly. Do not use more than 4 grams (4,000 milligrams) total of acetaminophen in one day.       Take your medicine as directed. Contact your healthcare provider if you think your medicine is not helping or if you have side effects. Tell him or her if you are allergic to any medicine. Keep a list of the medicines, vitamins, and herbs you take. Include the amounts, and when and why you take them. Bring the list or the pill bottles to follow-up visits. Carry your medicine list with you in case of an emergency.    Self-care:     Elevate the area above the level of your heart as often as you can. This will help decrease swelling and pain. Prop the area on pillows or blankets to keep it elevated comfortably.       Clean the area daily until the wound scabs over. Gently wash the area with soap and water. Pat dry. Use dressings as directed.       Place cool or warm, wet cloths on the area as directed. Use clean cloths and clean water. Leave it on the area until the cloth is room temperature. Pat the area dry with a clean, dry cloth. The cloths may help decrease pain.     Prevent cellulitis:     Do not scratch bug bites or areas of injury. You increase your risk for cellulitis by scratching these areas.       Do not share personal items, such as towels, clothing, and razors.       Clean exercise equipment with germ-killing  before and after you use it.      Wash your hands often. Use soap and water. Wash your hands after you use the bathroom, change a child's diapers, or sneeze. Wash your hands before you prepare or eat food. Use lotion to prevent dry, cracked skin. Handwashing           Wear pressure stockings as directed. You may be told to wear the stockings if you have peripheral edema. The stockings improve blood flow and decrease swelling.      Treat athlete’s foot. This can help prevent the spread of a bacterial skin infection.    Follow up with your healthcare provider within 3 days, or as directed: Your healthcare provider will check if your cellulitis is getting better. You may need different medicine. Write down your questions so you remember to ask them during your visits.

## 2023-09-10 NOTE — ED PROVIDER NOTE - PHYSICAL EXAMINATION
General: Patient in no acute distress, AAOX3.   HENMT: NC/AT, no nasal congestion, MMM, PERRL   Neck: supple, -Brudzinski and kerning sign. no midline ttp  CVS: regular rate and rhythm, no murmur  Resp: Good air entry bilaterally, No wheeze/rhonchi.  Abd: Soft non tender, non distended, +bowel sounds, No guarding, rebound tenderness   Ext: FROM in all ext, 2+ pulses throughout, cap refill<2 sec.  BACK: no midline tenderness, no stepoffs, no CVA ttp  NEURO: no focal deficit, gross motor and sensory intact throughout, gait stable. clear speech. pronator drift negative. General: Patient in no acute distress, AAOX3.   HENMT: NC/AT, no nasal congestion, MMM, PERRL   Neck: supple, -Brudzinski and kerning sign. no midline ttp  CVS: regular rate and rhythm, no murmur  Resp: Good air entry bilaterally, No wheeze/rhonchi.  Abd: Soft non tender, non distended, +bowel sounds, No guarding, rebound tenderness   Ext: FROM in all ext, 2+ pulses throughout, cap refill<2 sec. +R arm with 3-4cm erythema and ttp around injection site  BACK: no midline tenderness, no stepoffs, no CVA ttp  NEURO: no focal deficit, gross motor and sensory intact throughout, gait stable. clear speech. pronator drift negative. General: Patient in no acute distress, AAOX3.   HENMT: NC/AT, no nasal congestion, MMM, PERRL   Neck: supple, no nuchal rigidity, -Brudzinski and kerning sign. no midline ttp  CVS: regular rate and rhythm, no murmur  Resp: Good air entry bilaterally, No wheeze/rhonchi.  Abd: Soft non tender, non distended, +bowel sounds, No guarding, rebound tenderness   Ext: FROM in all ext, 2+ pulses throughout, cap refill<2 sec. +R arm with 3-4cm erythema and ttp around injection site  BACK: no midline tenderness, no stepoffs, no CVA ttp  NEURO: no focal deficit, gross motor and sensory intact throughout, gait stable. clear speech. pronator drift negative.

## 2023-09-10 NOTE — ED PROVIDER NOTE - NS_ ATTENDINGSCRIBEDETAILS _ED_A_ED_FT
I, Aaron Pryor DO,  performed the initial face to face bedside interview with this patient regarding history of present illness, review of symptoms and relevant past medical, social and family history.  I completed an independent physical examination.  I was the initial provider who evaluated this patient.   I personally saw the patient and performed a substantive portion of the visit including all aspects of the medical decision making.  The history, relevant review of systems, past medical and surgical history, medical decision making, and physical examination was documented by the scribe in my presence and I attest to the accuracy of the documentation.

## 2023-09-10 NOTE — ED PROVIDER NOTE - PROGRESS NOTE DETAILS
Patient reevaluated.  States her headache has resolved.  And she feels better.   Labs noted stable for mild  leukocytosis of 14.   Most likely secondary to cellulitis of the right arm.  X-ray, CTB, UA and blood work unremarkable for any other acute pathology.  patient afebrile at present and on arrival even though the last dose  Tylenol was 12 AM midnight.  unlikely meningitis based on the presentation.  Shared decision making with patient and her boyfriend.  Discussed the signs and symptoms to be observed at home.  advised to come back to ER if notice any change in the mental status or worsening cellulitis of the arm. .   Advised to follow-up with PMD in 2-3 days. red flags discussed.  Return precautions given

## 2023-09-10 NOTE — ED ADULT NURSE NOTE - OBJECTIVE STATEMENT
pt presents to ED from home for fever, body aches, HA. reports temp max 102. denies cough, chest pain, SOB. A&O x4. no blurry vision. BFAST-.

## 2023-09-10 NOTE — ED PROVIDER NOTE - CLINICAL SUMMARY MEDICAL DECISION MAKING FREE TEXT BOX
33 y/o female c/o body aches, fever, and HA for 2 days s/p pneumovax on Friday. most likely tension headache with fevers due to recent vaccination. will r/o any other infectious pathology. pt with no neuro deficit. unlikely meningitis based on exam and presentation. plan to do labs, ekg, imaging. meds and reassess. 31 y/o female c/o body aches, fever, and HA for 2 days s/p pneumovax on Friday. most likely tension headache with fevers due to recent vaccination. questionable mild cellulitis vs reaction to pneumovax vaccine; will r/o any other infectious pathology. pt with no neuro deficit. unlikely meningitis based on exam and presentation. plan to do labs, ekg, imaging. meds and reassess.

## 2023-09-11 LAB
CULTURE RESULTS: NO GROWTH — SIGNIFICANT CHANGE UP
SPECIMEN SOURCE: SIGNIFICANT CHANGE UP

## 2023-09-12 ENCOUNTER — EMERGENCY (EMERGENCY)
Facility: HOSPITAL | Age: 33
LOS: 0 days | Discharge: HOME | End: 2023-09-12
Attending: EMERGENCY MEDICINE
Payer: COMMERCIAL

## 2023-09-12 VITALS
OXYGEN SATURATION: 100 % | TEMPERATURE: 98 F | RESPIRATION RATE: 18 BRPM | SYSTOLIC BLOOD PRESSURE: 124 MMHG | HEART RATE: 91 BPM | DIASTOLIC BLOOD PRESSURE: 80 MMHG

## 2023-09-12 VITALS — HEIGHT: 68 IN | WEIGHT: 143.96 LBS

## 2023-09-12 DIAGNOSIS — K21.9 GASTRO-ESOPHAGEAL REFLUX DISEASE WITHOUT ESOPHAGITIS: ICD-10-CM

## 2023-09-12 DIAGNOSIS — Z86.011 PERSONAL HISTORY OF BENIGN NEOPLASM OF THE BRAIN: Chronic | ICD-10-CM

## 2023-09-12 DIAGNOSIS — Z88.8 ALLERGY STATUS TO OTHER DRUGS, MEDICAMENTS AND BIOLOGICAL SUBSTANCES: ICD-10-CM

## 2023-09-12 DIAGNOSIS — Z98.890 OTHER SPECIFIED POSTPROCEDURAL STATES: Chronic | ICD-10-CM

## 2023-09-12 DIAGNOSIS — E03.9 HYPOTHYROIDISM, UNSPECIFIED: ICD-10-CM

## 2023-09-12 DIAGNOSIS — D80.2 SELECTIVE DEFICIENCY OF IMMUNOGLOBULIN A [IGA]: ICD-10-CM

## 2023-09-12 DIAGNOSIS — M79.10 MYALGIA, UNSPECIFIED SITE: ICD-10-CM

## 2023-09-12 DIAGNOSIS — Z86.011 PERSONAL HISTORY OF BENIGN NEOPLASM OF THE BRAIN: ICD-10-CM

## 2023-09-12 DIAGNOSIS — R50.9 FEVER, UNSPECIFIED: ICD-10-CM

## 2023-09-12 DIAGNOSIS — R51.9 HEADACHE, UNSPECIFIED: ICD-10-CM

## 2023-09-12 DIAGNOSIS — Z88.1 ALLERGY STATUS TO OTHER ANTIBIOTIC AGENTS STATUS: ICD-10-CM

## 2023-09-12 DIAGNOSIS — Q79.60 EHLERS-DANLOS SYNDROME, UNSPECIFIED: ICD-10-CM

## 2023-09-12 DIAGNOSIS — Z88.2 ALLERGY STATUS TO SULFONAMIDES: ICD-10-CM

## 2023-09-12 DIAGNOSIS — L03.113 CELLULITIS OF RIGHT UPPER LIMB: ICD-10-CM

## 2023-09-12 DIAGNOSIS — J45.909 UNSPECIFIED ASTHMA, UNCOMPLICATED: ICD-10-CM

## 2023-09-12 LAB
ANION GAP SERPL CALC-SCNC: 4 MMOL/L — LOW (ref 5–17)
BASOPHILS # BLD AUTO: 0.03 K/UL — SIGNIFICANT CHANGE UP (ref 0–0.2)
BASOPHILS NFR BLD AUTO: 0.4 % — SIGNIFICANT CHANGE UP (ref 0–2)
BUN SERPL-MCNC: 7 MG/DL — SIGNIFICANT CHANGE UP (ref 7–23)
CALCIUM SERPL-MCNC: 9.3 MG/DL — SIGNIFICANT CHANGE UP (ref 8.5–10.1)
CHLORIDE SERPL-SCNC: 106 MMOL/L — SIGNIFICANT CHANGE UP (ref 96–108)
CO2 SERPL-SCNC: 27 MMOL/L — SIGNIFICANT CHANGE UP (ref 22–31)
CREAT SERPL-MCNC: 0.74 MG/DL — SIGNIFICANT CHANGE UP (ref 0.5–1.3)
EGFR: 110 ML/MIN/1.73M2 — SIGNIFICANT CHANGE UP
EOSINOPHIL # BLD AUTO: 0.03 K/UL — SIGNIFICANT CHANGE UP (ref 0–0.5)
EOSINOPHIL NFR BLD AUTO: 0.4 % — SIGNIFICANT CHANGE UP (ref 0–6)
GLUCOSE SERPL-MCNC: 78 MG/DL — SIGNIFICANT CHANGE UP (ref 70–99)
HCT VFR BLD CALC: 38.8 % — SIGNIFICANT CHANGE UP (ref 34.5–45)
HGB BLD-MCNC: 13.3 G/DL — SIGNIFICANT CHANGE UP (ref 11.5–15.5)
IMM GRANULOCYTES NFR BLD AUTO: 0.1 % — SIGNIFICANT CHANGE UP (ref 0–0.9)
LACTATE SERPL-SCNC: 1 MMOL/L — SIGNIFICANT CHANGE UP (ref 0.7–2)
LYMPHOCYTES # BLD AUTO: 1.53 K/UL — SIGNIFICANT CHANGE UP (ref 1–3.3)
LYMPHOCYTES # BLD AUTO: 22.6 % — SIGNIFICANT CHANGE UP (ref 13–44)
MCHC RBC-ENTMCNC: 31.8 PG — SIGNIFICANT CHANGE UP (ref 27–34)
MCHC RBC-ENTMCNC: 34.3 GM/DL — SIGNIFICANT CHANGE UP (ref 32–36)
MCV RBC AUTO: 92.8 FL — SIGNIFICANT CHANGE UP (ref 80–100)
MONOCYTES # BLD AUTO: 0.36 K/UL — SIGNIFICANT CHANGE UP (ref 0–0.9)
MONOCYTES NFR BLD AUTO: 5.3 % — SIGNIFICANT CHANGE UP (ref 2–14)
NEUTROPHILS # BLD AUTO: 4.8 K/UL — SIGNIFICANT CHANGE UP (ref 1.8–7.4)
NEUTROPHILS NFR BLD AUTO: 71.2 % — SIGNIFICANT CHANGE UP (ref 43–77)
PLATELET # BLD AUTO: 392 K/UL — SIGNIFICANT CHANGE UP (ref 150–400)
POTASSIUM SERPL-MCNC: 4.5 MMOL/L — SIGNIFICANT CHANGE UP (ref 3.5–5.3)
POTASSIUM SERPL-SCNC: 4.5 MMOL/L — SIGNIFICANT CHANGE UP (ref 3.5–5.3)
RBC # BLD: 4.18 M/UL — SIGNIFICANT CHANGE UP (ref 3.8–5.2)
RBC # FLD: 12.3 % — SIGNIFICANT CHANGE UP (ref 10.3–14.5)
SODIUM SERPL-SCNC: 137 MMOL/L — SIGNIFICANT CHANGE UP (ref 135–145)
WBC # BLD: 6.76 K/UL — SIGNIFICANT CHANGE UP (ref 3.8–10.5)
WBC # FLD AUTO: 6.76 K/UL — SIGNIFICANT CHANGE UP (ref 3.8–10.5)

## 2023-09-12 PROCEDURE — 87040 BLOOD CULTURE FOR BACTERIA: CPT

## 2023-09-12 PROCEDURE — 99283 EMERGENCY DEPT VISIT LOW MDM: CPT

## 2023-09-12 PROCEDURE — 99284 EMERGENCY DEPT VISIT MOD MDM: CPT

## 2023-09-12 PROCEDURE — 83605 ASSAY OF LACTIC ACID: CPT

## 2023-09-12 PROCEDURE — 80048 BASIC METABOLIC PNL TOTAL CA: CPT

## 2023-09-12 PROCEDURE — 36415 COLL VENOUS BLD VENIPUNCTURE: CPT

## 2023-09-12 PROCEDURE — 85025 COMPLETE CBC W/AUTO DIFF WBC: CPT

## 2023-09-12 RX ORDER — OXYCODONE HYDROCHLORIDE 5 MG/1
1 TABLET ORAL
Qty: 8 | Refills: 0
Start: 2023-09-12 | End: 2023-09-15

## 2023-09-12 NOTE — ED STATDOCS - NSFOLLOWUPINSTRUCTIONS_ED_ALL_ED_FT
Continue to take the clindamycin.   Apply warm compresses to the area of redness  Take motrin or tylenol for pain/fever.   Return to the Emergency Department for worsening or persistent symptoms, and/or ANY NEW OR CONCERNING SYMPTOMS. If you have issues obtaining follow up, please call: [phone] or [phone]  to obtain a doctor or specialist who takes your insurance in your area.

## 2023-09-12 NOTE — ED STATDOCS - ATTENDING APP SHARED VISIT CONTRIBUTION OF CARE
I, Mable Forrest MD,  performed the initial face to face bedside interview with this patient regarding history of present illness, review of symptoms and relevant past medical, social and family history.  I completed an independent physical examination.  I was the initial provider who evaluated this patient.   I personally saw the patient and performed a substantive portion of the visit including all aspects of the medical decision making.  I have signed out the follow up of any pending tests (i.e. labs, radiological studies) to the [facility].  I have communicated the patient’s plan of care and disposition with the [facility].

## 2023-09-12 NOTE — ED STATDOCS - SKIN, MLM
Right arm - injection site with 3x4 cm area of redness mid triceps area, pt with no proximal redness, pt with 2x4 cm of redness extend medial inferior from injection site.

## 2023-09-12 NOTE — ED STATDOCS - PATIENT PORTAL LINK FT
You can access the FollowMyHealth Patient Portal offered by [facility] by registering at the following website: [email or web address]. By joining [facility]’s FollowMyHealth portal, you will also be able to view your health information using other applications (apps) compatible with our system.

## 2023-09-12 NOTE — ED ADULT TRIAGE NOTE - CHIEF COMPLAINT QUOTE
Pt presents to ed for c/c of right arm swelling, +red/warm to touch/swollen to right side deltoid, pt recently received pneumonia vaccine last Friday to right arm and has since developed redness to arm. +low grade fevers  "" -chest pain -SOB

## 2023-09-12 NOTE — ED STATDOCS - CLINICAL SUMMARY MEDICAL DECISION MAKING FREE TEXT BOX
33 yo pt presents to ED for redness and pain after pneumococcal vaccine 4 days ago.  Plan check labs, reeval.

## 2023-09-12 NOTE — ED ADULT NURSE NOTE - OBJECTIVE STATEMENT
Pt AOx4 from home c/o right upper arm redness, pain, swelling, and fever tmax 102 beginning Saturday and becoming progressively larger after pneumonia vax on Friday to right deltoid. PMhx asthma and selective IgG. Pt denies chest pain, sob, n/v/d, dizziness or blurred vision.

## 2023-09-12 NOTE — ED STATDOCS - COVID-19 RESULT
Return patient call, she wanted to reschedule for following week due to her ride cannot take her at that time and date. Reschedule 09/09/20 at [facility].    ----- Message from Antoni Valentin sent at 8/27/2020  4:33 PM CDT -----  Contact: Patience/pt daughter    ----- Message -----  From: Akbar Marin  Sent: 8/27/2020   4:26 PM CDT  To: [facility] Staff    Please call pt daughter (Patience) at [phone]    Patient daughter stated 09/02/2020 would be ok to schedule but afternoon is preferred    Thank you         NEGATIVE

## 2023-09-12 NOTE — ED STATDOCS - OBJECTIVE STATEMENT
31 y/o female w/PMHx of asthma, GERD, hypothyroid, Eamon-Danlos syndrome type 3, IgA deficiency, ganglioneurocytoma (2010), and strabismus b/l presents to the ED c/o fever, HA, and body aches x4 days. Pt reports T-max 102 on 9/10.  Pt was seen ED and was started on clindamycin.  Pt has notices the redness extending down.  Pt with low grade temps of 99.  Pain is about the same, no drainage.  No travel, no sick contact.

## 2023-09-12 NOTE — ED STATDOCS - CHIEF COMPLAINT
The patient is a 32y year old Female complaining of pain, arm. Stage 2: Additional Anesthesia Type: 1% lidocaine with epinephrine

## 2023-09-12 NOTE — ED STATDOCS - PROGRESS NOTE DETAILS
33 yo female with a PMH of IgA deficiency, presents with R arm redness and pain s/p receiving a pneumonia vaccine a few days ago. Pt was experiencing redness and was evaluated in the ER and prescribed clindamycin. Pt returned because she noticed the redness spreading.   Will check labs and reeval. -Uriel Nelson PA-C Labs improved. Discussed with pt. Pt ok with continuing her clindamycin. Pt requesting something stronger for pain. States her arm is causing worsening headaches and the motrin/tylenol not helping. Will give a few pills of oxycodone and states she will speak with her neurologist. Will d/c home. -Uriel Nelson PA-C

## 2023-09-15 LAB
CULTURE RESULTS: SIGNIFICANT CHANGE UP
CULTURE RESULTS: SIGNIFICANT CHANGE UP
SPECIMEN SOURCE: SIGNIFICANT CHANGE UP
SPECIMEN SOURCE: SIGNIFICANT CHANGE UP

## 2023-09-27 ENCOUNTER — TRANSCRIPTION ENCOUNTER (OUTPATIENT)
Age: 33
End: 2023-09-27

## 2023-10-03 ENCOUNTER — APPOINTMENT (OUTPATIENT)
Dept: INTERNAL MEDICINE | Facility: CLINIC | Age: 33
End: 2023-10-03
Payer: COMMERCIAL

## 2023-10-03 VITALS
RESPIRATION RATE: 16 BRPM | TEMPERATURE: 98.8 F | WEIGHT: 145 LBS | SYSTOLIC BLOOD PRESSURE: 113 MMHG | BODY MASS INDEX: 25.69 KG/M2 | OXYGEN SATURATION: 99 % | HEART RATE: 99 BPM | DIASTOLIC BLOOD PRESSURE: 76 MMHG | HEIGHT: 63 IN

## 2023-10-03 DIAGNOSIS — J45.40 MODERATE PERSISTENT ASTHMA, UNCOMPLICATED: ICD-10-CM

## 2023-10-03 DIAGNOSIS — Z23 ENCOUNTER FOR IMMUNIZATION: ICD-10-CM

## 2023-10-03 DIAGNOSIS — D80.2 SELECTIVE DEFICIENCY OF IMMUNOGLOBULIN A [IGA]: ICD-10-CM

## 2023-10-03 DIAGNOSIS — J45.990 EXERCISE INDUCED BRONCHOSPASM: ICD-10-CM

## 2023-10-03 DIAGNOSIS — J45.991 COUGH VARIANT ASTHMA: ICD-10-CM

## 2023-10-03 PROCEDURE — 94727 GAS DIL/WSHOT DETER LNG VOL: CPT

## 2023-10-03 PROCEDURE — 94060 EVALUATION OF WHEEZING: CPT

## 2023-10-03 PROCEDURE — 94729 DIFFUSING CAPACITY: CPT

## 2023-10-03 PROCEDURE — 99215 OFFICE O/P EST HI 40 MIN: CPT | Mod: 25

## 2023-10-03 PROCEDURE — ZZZZZ: CPT

## 2023-10-03 RX ORDER — LEVOTHYROXINE SODIUM 88 UG/1
88 TABLET ORAL
Refills: 0 | Status: DISCONTINUED | COMMUNITY
End: 2023-10-03

## 2023-10-03 RX ORDER — DOXYCYCLINE HYCLATE 100 MG/1
100 TABLET ORAL TWICE DAILY
Qty: 20 | Refills: 0 | Status: DISCONTINUED | COMMUNITY
Start: 2022-11-22 | End: 2023-10-03

## 2023-10-03 RX ORDER — TIOTROPIUM BROMIDE INHALATION SPRAY 1.56 UG/1
1.25 SPRAY, METERED RESPIRATORY (INHALATION)
Qty: 2 | Refills: 3 | Status: DISCONTINUED | COMMUNITY
Start: 2020-08-17 | End: 2023-10-03

## 2023-10-03 RX ORDER — LEVALBUTEROL HYDROCHLORIDE 0.63 MG/3ML
0.63 SOLUTION RESPIRATORY (INHALATION)
Qty: 30 | Refills: 3 | Status: DISCONTINUED | COMMUNITY
Start: 1900-01-01 | End: 2023-10-03

## 2023-10-03 RX ORDER — BENZONATATE 200 MG/1
200 CAPSULE ORAL 3 TIMES DAILY
Qty: 90 | Refills: 5 | Status: DISCONTINUED | COMMUNITY
Start: 2021-08-26 | End: 2023-10-03

## 2023-10-03 RX ORDER — GUAIFENESIN AND CODEINE PHOSPHATE 100; 10 MG/5ML; MG/5ML
100-10 SYRUP ORAL
Qty: 120 | Refills: 0 | Status: DISCONTINUED | COMMUNITY
Start: 2020-03-16 | End: 2023-10-03

## 2023-10-03 RX ORDER — PREDNISONE 10 MG/1
10 TABLET ORAL
Qty: 26 | Refills: 0 | Status: DISCONTINUED | COMMUNITY
Start: 2022-06-15 | End: 2023-10-03

## 2023-10-03 RX ORDER — PREDNISONE 20 MG/1
20 TABLET ORAL DAILY
Qty: 21 | Refills: 0 | Status: DISCONTINUED | COMMUNITY
Start: 2022-11-22 | End: 2023-10-03

## 2023-10-18 NOTE — DISCHARGE NOTE ADULT - PAIN PRESENT
No Burow's Advancement Flap Text: The defect edges were debeveled with a #15 scalpel blade. Given the location of the defect and the proximity to free margins a Burow's advancement flap was deemed most appropriate. Using a sterile surgical marker, the appropriate advancement flap was drawn incorporating the defect and placing the expected incisions within the relaxed skin tension lines where possible. The area thus outlined was incised deep to adipose tissue with a #15 scalpel blade. The skin margins were undermined to an appropriate distance in all directions utilizing iris scissors. Following this, the designed flap was advanced and carried over into the primary defect and sutured into place.

## 2023-10-21 ENCOUNTER — EMERGENCY (EMERGENCY)
Facility: HOSPITAL | Age: 33
LOS: 1 days | Discharge: HOME | End: 2023-10-21
Attending: EMERGENCY MEDICINE
Payer: COMMERCIAL

## 2023-10-21 VITALS
SYSTOLIC BLOOD PRESSURE: 147 MMHG | TEMPERATURE: 98 F | HEART RATE: 104 BPM | HEIGHT: 68 IN | RESPIRATION RATE: 18 BRPM | DIASTOLIC BLOOD PRESSURE: 90 MMHG | OXYGEN SATURATION: 97 %

## 2023-10-21 DIAGNOSIS — Z98.890 OTHER SPECIFIED POSTPROCEDURAL STATES: Chronic | ICD-10-CM

## 2023-10-21 DIAGNOSIS — Z86.011 PERSONAL HISTORY OF BENIGN NEOPLASM OF THE BRAIN: Chronic | ICD-10-CM

## 2023-10-21 PROCEDURE — 99284 EMERGENCY DEPT VISIT MOD MDM: CPT

## 2023-10-21 PROCEDURE — 99283 EMERGENCY DEPT VISIT LOW MDM: CPT

## 2023-10-21 RX ORDER — ACETAMINOPHEN 500 MG
650 TABLET ORAL ONCE
Refills: 0 | Status: COMPLETED | OUTPATIENT
Start: 2023-10-21 | End: 2023-10-21

## 2023-10-21 RX ORDER — METHOCARBAMOL 500 MG/1
1500 TABLET, FILM COATED ORAL ONCE
Refills: 0 | Status: COMPLETED | OUTPATIENT
Start: 2023-10-21 | End: 2023-10-21

## 2023-10-21 RX ADMIN — Medication 650 MILLIGRAM(S): at 14:00

## 2023-10-21 RX ADMIN — METHOCARBAMOL 1500 MILLIGRAM(S): 500 TABLET, FILM COATED ORAL at 14:00
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
## 2023-10-21 NOTE — ED ADULT NURSE NOTE - NSFALLUNIVINTERV_ED_ALL_ED
Encourage patient to sit up slowly, dangle for a short time, stand at bedside before walking/Monitor gait and stability/Monitor for mental status changes and reorient to person, place, and time, as needed/Reinforce activity limits and safety measures with patient and family/Bed/Stretcher in lowest position, wheels locked, appropriate side rails in place/Call bell, personal items and telephone in reach/Instruct patient to call for assistance before getting out of bed/chair/stretcher/Non-slip footwear applied when patient is off stretcher/[place] to call system/Physically safe environment - no spills, clutter or unnecessary equipment/Purposeful proactive rounding/Room/bathroom lighting operational, light cord in reach
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
## 2023-10-21 NOTE — ED ADULT TRIAGE NOTE - SPO2 (%)
97

## 2023-10-21 NOTE — ED ADULT NURSE NOTE - NS ED NOTE ABUSE RESPONSE YN
[facility] Non Surgical Follow up Visit   Date of visit: 4/8/2022  Physician: LONDON Serna MD, MD  Primary Care is Shirley Freeman.  Maddy Ortiz   37 year old  female     Initial Weigfht: 202#  Initial BMI: 34.14  Today's Weight:   Wt Readings from Last 1 Encounters:   04/12/22 114.8 kg (253 lb)     Body mass index is 44.83 kg/m .           Assessment and Plan   Assessment: Maddy is a 37 year old year old female who presents for medical weight management.      Plan:    1. Morbid obesity (H)  Patient was congratulated on the healthy changes she has made thus far. Healthy habits to assist with further weight loss were discussed. She needs to increase her vegetable intake, increase her protein intake, and decrease her starches and fats. She will continue the metformin. We discussed the patient's co-morbid conditions including type 2 DM and fatty liver disease. These likely will improve with healthy habits and weight loss.    2. Type 2 diabetes mellitus without complication, without long-term current use of insulin (H)  This may improve with healthy habits and weight loss. She would be a good candidate for liraglutide or semiglutide. I am not comfortable prescribing these unless she gets on some form of birth control. She will discuss with her primary MD.    3. Fatty liver  This may improve with healthy habits and weight loss.    Follow up in 3 months with myself, in 1 month with our dietician           INTERIM HISTORY  Patient is taking metformin and she is tolerating it. She is concerned about her diabetes. Her blood sugars have been running high (200-300). She states the people at the grocery store don't want her to buy vegetables. She complains of hunger.     DIETARY HISTORY  Meals Per Day: 3  Eating Protein First?: no  Food Diary: B:rice cakes with peanut butter or nutella or leftovers L:chips and pepperoni sandwich D:pasta salad with vegetables, chicken nuggets  Typical Snack:  cucumber  Fluid Intake: not sure  Portion Control: not sure  Calorie Containing Beverages: none  Typical Protein Food Choices: not sure      Positive Changes Since Last Visit: none  Struggling With: feels she doesn't have enough time to do this    Knowledgeable in Reading Food Labels: no  Getting Adequate Protein: no      PHYSICAL ACTIVITY PATTERNS:  None- neuropathy    REVIEW OF SYSTEMS  GENERAL/CONSTITUTIONAL:  Fatigue: yes  PULMONARY:  Dyspnea on exertion: she is not exerting herself  PSYCHIATRIC:  Moods: irritable  MUSCULOSKELETAL/RHEUMATOLOGIC  Arthralgias: yes  Myalgias: yes  ENDOCRINE:  Monitoring Blood Sugars: yes  Sugars Well Controlled: no  No personal or family history of medullary thyroid cancer no  :  Birth control: none       Patient Profile   Social History     Social History Narrative    One child    Education: some college        October 21, 2021    ENVIRONMENTAL HISTORY: [place] The home is heated with a electric furnace. They do not have central air conditioning, does have box air conditioner in the wall and has air purifier. The patient's bedroom is furnished with stuffed animals in bed, carpeting in bedroom and fabric window coverings. Pets inside the apartment includes 1 cat. There is history of cockroach or mice infestation. There are no smokers in the house.  The apartment does not have a basement.         Past Medical History   Past Medical History:   Diagnosis Date     Bipolar 1 disorder (H) 12/6/2012     Depressive disorder      Diabetes mellitus, type 2 (H) 12/13/2021     Paranoid type delusional disorder (H) 11/14/2012     Patient Active Problem List   Diagnosis     Animal dander allergy     CARDIOVASCULAR SCREENING; LDL GOAL LESS THAN 160     Psychosis (H)     Paranoid type delusional disorder (H)     Bipolar 1 disorder (H)     Insomnia     Depression with anxiety     Seasonal allergic rhinitis due to pollen     Allergic rhinitis  "due to mold     Allergic rhinitis due to animal dander     Allergic rhinitis due to dust mite     Encounter for IUD insertion     Schizoaffective disorder, bipolar type (H)     Gastroesophageal reflux disease without esophagitis     Paranoia (psychosis) (H)     Morbid obesity (H)     Schizoaffective disorder (H)     Umbilical hernia without obstruction and without gangrene     Fatty liver     Diabetes mellitus, type 2 (H)     Elevated LFTs     Disorganized behavior     Infection due to 2019 novel coronavirus       Past Surgical History  She has a past surgical history that includes tonsillectomy & adenoidectomy and tonsillectomy & adenoidectomy.     Examination   Ht 1.6 m (5' 2.99\")   Wt 114.8 kg (253 lb)   BMI 44.83 kg/m    GENERAL: Healthy, alert and no distress  EYES: Eyes grossly normal to inspection.  No discharge or erythema, or obvious scleral/conjunctival abnormalities.  RESP: No audible wheeze, cough, or visible cyanosis.  No visible retractions or increased work of breathing.    SKIN: Visible skin clear. No significant rash, abnormal pigmentation or lesions.  NEURO: Cranial nerves grossly intact.  Mentation and speech appropriate for age.  PSYCH: Mentation appears normal, affect normal/bright, judgement and insight intact, normal speech and appearance well-groomed.       Counseling:   We reviewed the important post op bariatric recommendations:  -eating 3 meals daily  -eating protein first, getting >60gm protein daily  -eating slowly, chewing food well  -avoiding/limiting calorie containing beverages  -limiting starchy vegetables and carbohydrates, choosing wheat, not white with breads,   crackers, pastas, marquita, bagels, tortillas, rice  -limiting restaurant or cafeteria eating to twice a week or less    We discussed the importance of restorative sleep and stress management in maintaining a healthy weight.  We discussed the National Weight Control Registry healthy weight maintenance strategies and ways to " optimize metabolism.  We discussed the importance of physical activity including cardiovascular and strength training in maintaining a healthier weight.    Total time spent on the date of this encounter doing: chart review, review of test results, patient visit, physical exam, education, counseling, developing plan of care and documentation: 48 minutes        LONDON Serna MD  [facility]    Maddy Ortiz is 37 year old  female who presents for a billable video visit today.    How would you like to obtain your AVS? MyChart  If dropped from the video visit, the video invitation should be resent by: Send to e-mail at: [email or web address]  Will anyone else be joining your video visit? No      Video Start Time: 3:45 pm    Are there any specific questions or needs that you would like addressed at your visit today?     Pt c/o fatigue. Constant fatigue.          Video-Visit Details    Type of service:  Video Visit    Video End Time (time video stopped): 4:06 PM  Originating Location (pt. Location): Home    Distant Location (provider location):  [facility] [place]     Platform used for Video Visit: [employer]          Yes

## 2023-10-21 NOTE — ED PROVIDER NOTE - PATIENT PORTAL LINK FT
You can access the FollowMyHealth Patient Portal offered by [facility] by registering at the following website: [email or web address]. By joining [facility]’s FollowMyHealth portal, you will also be able to view your health information using other applications (apps) compatible with our system.

## 2023-10-21 NOTE — ED PROVIDER NOTE - PHYSICAL EXAMINATION
General: Well appearing in no acute distress, alert and cooperative  Head: Normocephalic, atraumatic  Eyes: PERRLA, no conjunctival injection, no scleral icterus, EOMI  ENMT: Atraumatic external nose and ears  Neck: Soft and supple, full ROM without pain, no midline tenderness  Cardiac: Regular rate and regular rhythm, no murmurs, peripheral pulses 2+ and symmetric in all extremities, no LE edema.  Resp: Unlabored respiratory effort, speaking in full sentences  Abd: Soft, non-tender, non-distended, no guarding or rebound  MSK: Spine midline and non-tender, LUE with mild ecchymosis without tenderness.   Skin: Warm and dry  Neuro: AO x 3, moves all extremities symmetrically, Motor strength and sensation grossly intact
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
## 2023-10-22 RX ORDER — METHOCARBAMOL 500 MG/1
1 TABLET, FILM COATED ORAL
Qty: 9 | Refills: 0
Start: 2023-10-22 | End: 2023-10-24

## 2023-11-16 ENCOUNTER — NON-APPOINTMENT (OUTPATIENT)
Age: 33
End: 2023-11-16

## 2023-11-16 ENCOUNTER — TRANSCRIPTION ENCOUNTER (OUTPATIENT)
Age: 33
End: 2023-11-16

## 2023-11-16 DIAGNOSIS — R05.9 COUGH, UNSPECIFIED: ICD-10-CM

## 2023-11-16 DIAGNOSIS — U07.1 COVID-19: ICD-10-CM

## 2023-11-16 RX ORDER — PREDNISONE 20 MG/1
20 TABLET ORAL
Qty: 12 | Refills: 0 | Status: ACTIVE | COMMUNITY
Start: 2023-11-16 | End: 1900-01-01

## 2023-11-17 ENCOUNTER — TRANSCRIPTION ENCOUNTER (OUTPATIENT)
Age: 33
End: 2023-11-17

## 2023-11-17 RX ORDER — AZITHROMYCIN 500 MG/1
500 TABLET, FILM COATED ORAL DAILY
Qty: 7 | Refills: 0 | Status: ACTIVE | COMMUNITY
Start: 2023-11-17 | End: 1900-01-01

## 2023-11-27 ENCOUNTER — RX RENEWAL (OUTPATIENT)
Age: 33
End: 2023-11-27

## 2023-11-27 RX ORDER — LEVALBUTEROL TARTRATE 45 UG/1
45 AEROSOL, METERED ORAL
Qty: 1 | Refills: 2 | Status: ACTIVE | COMMUNITY
Start: 2023-06-02 | End: 1900-01-01

## 2023-11-29 ENCOUNTER — TRANSCRIPTION ENCOUNTER (OUTPATIENT)
Age: 33
End: 2023-11-29

## 2023-12-15 ENCOUNTER — TRANSCRIPTION ENCOUNTER (OUTPATIENT)
Age: 33
End: 2023-12-15

## 2023-12-15 RX ORDER — LEVALBUTEROL TARTRATE 45 UG/1
45 AEROSOL, METERED ORAL
Qty: 1 | Refills: 2 | Status: ACTIVE | COMMUNITY
Start: 2021-10-27 | End: 1900-01-01

## 2024-01-10 ENCOUNTER — APPOINTMENT (OUTPATIENT)
Dept: INTERNAL MEDICINE | Facility: CLINIC | Age: 34
End: 2024-01-10

## 2024-01-15 ENCOUNTER — TRANSCRIPTION ENCOUNTER (OUTPATIENT)
Age: 34
End: 2024-01-15

## 2024-01-15 RX ORDER — NEBULIZER ACCESSORIES
KIT MISCELLANEOUS
Qty: 1 | Refills: 0 | Status: ACTIVE | COMMUNITY
Start: 2024-01-15 | End: 1900-01-01

## 2024-02-15 NOTE — ED ADULT NURSE NOTE - NS ED NURSE PATIENT LEFT UNIT TIME
Operative Note     Date: 2/15/2024  OR Location: [facility] OR    Name: Cassia Martin, : 1987, Age: 36 y.o., MRN: [id number], Sex: female    Diagnosis    Pre-op Diagnosis:  Right knee mass, chondromalacia Post-op Diagnosis:  Same         Procedures  Right knee arthroscopy, mass removal /synovectomy, chondroplasty patella trochlea    Surgeons      * Jeronimo Frazier - Primary    Resident/Fellow/Other Assistant:  Surgeon(s) and Role:  Miles Figueroa PA-C     Procedure Summary  Anesthesia: General  ASA: II  Anesthesia Staff: Anesthesiologist: DO JUSTIN Jesus: ENA Guzman  Estimated Blood Loss: 10mL  Intra-op Medications:   Administrations occurring from 0735 to 0845 on 02/15/24:   Medication Name Total Dose   lactated Ringer's infusion Cannot be calculated              Anesthesia Record               Intraprocedure I/O Totals       None           Specimen:   ID Type Source Tests Collected by Time   1 : RIGHT KNEE MASS Tissue KNEE ARTHROSCOPIC SHAVINGS RIGHT SURGICAL PATHOLOGY EXAM Jeronimo Frazier MD 2/15/2024 0818        Staff:   Circulator: Venus Zepeda RN  Scrub Person: Ruby Piña           Implants:     Findings:   Operative findings: (Outerbridge classification 0-4)   Patella: 2-3  Trochlea: 4 lateral   Medial compartment: 1  Lateral compartment: 0    Indications:     The patient was seen in the preoperative area. The risks, benefits, complications, treatment options, non-operative alternatives, expected recovery and outcomes were discussed with the patient. The possibilities of reaction to medication, pulmonary aspiration, injury to surrounding structures, bleeding, recurrent infection, the need for additional procedures, failure to diagnose a condition, and creating a complication requiring transfusion or operation were discussed with the patient. The patient concurred with the proposed plan, giving informed consent.  The site of surgery was properly noted/marked if necessary per policy.  The patient has been actively warmed in preoperative area. Preoperative antibiotics have been ordered and given within 1 hours of incision. Venous thrombosis prophylaxis have been ordered.     Patient was noted to have internal derangement of the knee refractory to non-surgical modalities.  We discussed associated interventions and the risks of the surgery, including DVT/PE, infection, stiffness, medical complications, and incomplete relief of pre-operative symptoms.    Procedure Details:   The patient was met prior to surgery and the appropriate extremity was marked.  We reviewed recent health history and found no contraindication to proceeding with the planned procedure.  The patient was transported to the operating room and underwent general anesthesia.  The patient was positioned supine on the operative table with all kate prominences well-padded. A sequential compression device was placed on the contralateral leg.  A non-sterile thigh tourniquet was placed and a padded lateral thigh post.    The leg was prepped and draped in the usual sterile fashion.  A timeout was completed and antibiotic administration was in accordance with standard protocol.  The leg was exsanguinated using an Esmarch bandage and the tourniquet was inflated.  The superficial landmarks of the knee were palpated and anteromedial and anterolateral portals were created.    A diagnostic arthroscopy was performed utilizing a fluid pump with sterile saline.  The articular surface of the patella, trochlea, medial and lateral femoral condyles and tibial plateaus were evaluated.  The Outerbridge classifications are listed above.  The gutters were evaluated and no loose bodies were noted. The medial compartment was entered with valgus stress in a slightly flexed knee against the lateral post.  The assistant helped with this to facilitate visualization.  The meniscus was probed superiorly and inferiorly using a blunt probe.  The notch was inspected  and the ACL and PCL were healthy in appearance and had appropriate tension when probed.  The knee was then flexed into a figure of four position and the lateral compartment was entered.      The articular surface of the patella and trochlea had chondral wear and a component of delamination which we felt could contribute to mechanical symptoms.  A 3.5 mm aggressive plus shaver was used to debride the articular cartilage until stable margins were obtained.    The menisci were normal and healthy.    The mass was noted just posterior to the patellar tendon which was consistent with the preoperative imaging.  The color of the mass was also consistent with pigmented villonodular synovitis.  We used the shaver to clean up the tissue around it so we could obtain margins both medial and lateral as well as anterior and posterior it was then transected at its base using a 11 blade knife the majority of it was removed in entirety.  We then used a shaver to debride the remaining aspect near the stalk care was taken to preserve the intrameniscal ligament as well as the ACL insertion on the tibia.  The overall size was approximately 5 x 3 cm.    The knee was irrigated and lavaged to remove and loose meniscal or chondral debris.  A second pass was made diagnostically to confirm removal of any fragments and inspect for any additional pathology.  The residual fluid was expressed from the knee.  The portals were closed using a buried 3-0 monocryl suture.  Local anesthetic was injected into the soft tissue around the portals. Sterile bandages were placed.  The patient was stable to the recovery room.    I was present and participated in the entire procedure. The Physician Assistant participated in all critical elements of the procedure under my direct supervision. The surgical incision was closed by the PA under my indirect supervision. There were no qualified residents available to assist.    The physician assistant was present for  the entire case.  Given the nature of the procedure and disease process, a skilled surgical assistant was necessary for the case.  The assistant was necessary for retraction and helped directly facilitate completion of the surgery.  A certified scrub tech was at the back table managing instruments and supplies for the surgical procedure.      Complications:  None; patient tolerated the procedure well.    Disposition: PACU - hemodynamically stable.  Condition: stable         Jeronimo Frazier  Phone Number: [phone]         23:30

## 2024-02-22 ENCOUNTER — TRANSCRIPTION ENCOUNTER (OUTPATIENT)
Age: 34
End: 2024-02-22

## 2024-03-15 ENCOUNTER — RX RENEWAL (OUTPATIENT)
Age: 34
End: 2024-03-15

## 2024-04-12 NOTE — ED ADULT TRIAGE NOTE - CHIEF COMPLAINT QUOTE
Infusion Nursing Note:  Nataliya Aldrich presents today for Tysabri.    Patient seen by provider today: No   present during visit today: Not Applicable.    Note: Patient no longer needs to stay for 1 hour observation post Tysabri.    Patient requests 250 NS to be ran with Tysabri.       Intravenous Access:  Peripheral IV placed.    Treatment Conditions:  Tysabri pre-infusion checklist completed via touch program.      Post Infusion Assessment:  Patient tolerated infusion without incident.  Blood return noted pre and post infusion.  Site patent and intact, free from redness, edema or discomfort.  No evidence of extravasations.  Access discontinued per protocol.       Discharge Plan:   Discharge instructions reviewed with: Patient.  Patient and/or family verbalized understanding of discharge instructions and all questions answered.  AVS to patient via SchedulicityT.  Patient will return 5/24 for next appointment.   Patient discharged in stable condition accompanied by: self.  Departure Mode: Ambulatory.      Nidhi Brizuela RN    
pt states she used albuterol and duoneb neb at home and still not getting enough relief. cough. denies every needing to be intubated.

## 2024-04-16 ENCOUNTER — APPOINTMENT (OUTPATIENT)
Dept: INTERNAL MEDICINE | Facility: CLINIC | Age: 34
End: 2024-04-16
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
## 2024-05-29 NOTE — ASSESSMENT
[FreeTextEntry1] : XRAY PA/LAT Paola r/o pneumonia , \par Kenalog 60 mg IM given in office\par Continue Prednisone taper ( 60 mg x 3 days , 40 mg x 3 days, 20 mg x 3 days)\par continue  Budesonide and Duoneb via nebs \par Doxycycline 100 mg po BID x 7 days \par F/up with XRAY results \par \par  \par  
0 (no pain/absence of nonverbal indicators of pain)

## 2024-06-13 ENCOUNTER — TRANSCRIPTION ENCOUNTER (OUTPATIENT)
Age: 34
End: 2024-06-13

## 2024-08-19 ENCOUNTER — RX RENEWAL (OUTPATIENT)
Age: 34
End: 2024-08-19

## 2024-08-19 RX ORDER — FLUTICASONE PROPIONATE AND SALMETEROL 250; 50 UG/1; UG/1
250-50 POWDER RESPIRATORY (INHALATION)
Qty: 60 | Refills: 11 | Status: ACTIVE | COMMUNITY
Start: 2024-08-19 | End: 1900-01-01

## 2025-01-02 ENCOUNTER — TRANSCRIPTION ENCOUNTER (OUTPATIENT)
Age: 35
End: 2025-01-02

## 2025-02-07 NOTE — ED PROVIDER NOTE - DISPOSITION TYPE
[FreeTextEntry1] : 80yoF retired nurse w/PMHx of HTN, HLD returns for reevaluation of a previously chronic R anterior ankle wound which has healed but notes a new anterior ankle ulcer now 1wk old.  VNS has been dressing the wound TIW w/some improvement over the past few weeks.  Pt NOT compliant w/compression but has been leaving dressings in place, VNS is alternating between Mupirocin-->dry dressings and Medihoney-->dry dressings.  Excoriated skin noted throughout the RLE but no open ulcers noted, no drainage or erythema appreciated.  Leg moisturized w/Vitamin A&D and wrapped w/ACE.  Instructed pt to continue BID moisturizer and compression at home and RTO PRN.  Will continue VNS qwk to monitor the wound until they d/c care.
[FreeTextEntry1] : 80yoF retired nurse w/PMHx of HTN, HLD returns for reevaluation of a previously chronic R anterior ankle wound which has healed but notes a new anterior ankle ulcer now 1wk old.  VNS has been dressing the wound TIW w/some improvement over the past few weeks.  Pt NOT compliant w/compression but has been leaving dressings in place, VNS is alternating between Mupirocin-->dry dressings and Medihoney-->dry dressings.  Excoriated skin noted throughout the RLE but no open ulcers noted, no drainage or erythema appreciated.  Leg moisturized w/Vitamin A&D and wrapped w/ACE.  Instructed pt to continue BID moisturizer and compression at home and RTO PRN.  Will continue VNS qwk to monitor the wound until they d/c care.
DISCHARGE

## 2025-02-17 ENCOUNTER — APPOINTMENT (OUTPATIENT)
Dept: INTERNAL MEDICINE | Facility: CLINIC | Age: 35
End: 2025-02-17

## 2025-02-26 ENCOUNTER — TRANSCRIPTION ENCOUNTER (OUTPATIENT)
Age: 35
End: 2025-02-26

## 2025-03-17 ENCOUNTER — TRANSCRIPTION ENCOUNTER (OUTPATIENT)
Age: 35
End: 2025-03-17

## 2025-03-18 ENCOUNTER — TRANSCRIPTION ENCOUNTER (OUTPATIENT)
Age: 35
End: 2025-03-18

## 2025-03-19 ENCOUNTER — TRANSCRIPTION ENCOUNTER (OUTPATIENT)
Age: 35
End: 2025-03-19

## 2025-03-20 ENCOUNTER — TRANSCRIPTION ENCOUNTER (OUTPATIENT)
Age: 35
End: 2025-03-20

## 2025-03-24 ENCOUNTER — TRANSCRIPTION ENCOUNTER (OUTPATIENT)
Age: 35
End: 2025-03-24

## 2025-03-24 ENCOUNTER — RX RENEWAL (OUTPATIENT)
Age: 35
End: 2025-03-24

## 2025-04-10 ENCOUNTER — APPOINTMENT (OUTPATIENT)
Dept: INTERNAL MEDICINE | Facility: CLINIC | Age: 35
End: 2025-04-10

## 2025-05-30 ENCOUNTER — APPOINTMENT (OUTPATIENT)
Dept: INTERNAL MEDICINE | Facility: CLINIC | Age: 35
End: 2025-05-30

## 2025-07-07 ENCOUNTER — APPOINTMENT (OUTPATIENT)
Dept: INTERNAL MEDICINE | Facility: CLINIC | Age: 35
End: 2025-07-07
Payer: COMMERCIAL

## 2025-07-07 VITALS
SYSTOLIC BLOOD PRESSURE: 108 MMHG | RESPIRATION RATE: 16 BRPM | DIASTOLIC BLOOD PRESSURE: 72 MMHG | TEMPERATURE: 98.1 F | OXYGEN SATURATION: 97 % | HEIGHT: 63 IN | WEIGHT: 159 LBS | BODY MASS INDEX: 28.17 KG/M2 | HEART RATE: 105 BPM

## 2025-07-07 PROCEDURE — 99214 OFFICE O/P EST MOD 30 MIN: CPT

## 2025-07-07 PROCEDURE — 94060 EVALUATION OF WHEEZING: CPT

## 2025-07-07 RX ORDER — BUDESONIDE 180 UG/1
180 AEROSOL, POWDER RESPIRATORY (INHALATION)
Qty: 3 | Refills: 3 | Status: ACTIVE | COMMUNITY
Start: 2025-07-07 | End: 1900-01-01

## 2025-07-07 RX ORDER — ALBUTEROL SULFATE 90 UG/1
108 (90 BASE) INHALANT RESPIRATORY (INHALATION)
Qty: 3 | Refills: 1 | Status: ACTIVE | COMMUNITY
Start: 2025-07-07 | End: 1900-01-01